# Patient Record
Sex: MALE | Race: WHITE | Employment: OTHER | URBAN - METROPOLITAN AREA
[De-identification: names, ages, dates, MRNs, and addresses within clinical notes are randomized per-mention and may not be internally consistent; named-entity substitution may affect disease eponyms.]

---

## 2017-05-24 ENCOUNTER — OFFICE VISIT (OUTPATIENT)
Dept: CARDIOLOGY CLINIC | Age: 82
End: 2017-05-24

## 2017-05-24 VITALS
OXYGEN SATURATION: 93 % | HEIGHT: 65 IN | HEART RATE: 72 BPM | BODY MASS INDEX: 22.33 KG/M2 | DIASTOLIC BLOOD PRESSURE: 78 MMHG | WEIGHT: 134 LBS | SYSTOLIC BLOOD PRESSURE: 142 MMHG | RESPIRATION RATE: 18 BRPM

## 2017-05-24 DIAGNOSIS — R06.02 SOB (SHORTNESS OF BREATH): Primary | ICD-10-CM

## 2017-05-24 DIAGNOSIS — I10 ESSENTIAL HYPERTENSION: ICD-10-CM

## 2017-05-24 DIAGNOSIS — R42 DIZZINESS: ICD-10-CM

## 2017-05-24 NOTE — PROGRESS NOTES
LAST OFFICE VISIT : 11/23/2016        ICD-10-CM ICD-9-CM   1. SOB (shortness of breath) R06.02 786.05   2. Dizziness R42 780.4   3. Essential hypertension I10 401.9            Stevo Lagunas is a 80 y.o. male with SOB referred for 6 month follow up evaluation. Cardiac risk factors: sedentary life style, male gender  I have personally obtained the history from the patient. HISTORY OF PRESENTING ILLNESS      Mr. Justin Savage is doing well from a cardiac standpoint. Recently diagnosed with Sjogren's disease and reports feeling weak from this. Activity level is also limited by hip and joint pain. He continues to ambulate with a cane. He has chronic GILMORE and fatigue with mild to moderate activity. He leads a sedentary lifestyle, and struggles to have motivation to work out. Denies any exertional symptoms. No claudication symptoms. The patient denies chest pain/ shortness of breath, orthopnea, PND, LE edema, palpitations, syncope, presyncope or fatigue. ACTIVE PROBLEM LIST     Patient Active Problem List    Diagnosis Date Noted    HTN (hypertension) 11/23/2016    Dizziness 08/09/2016           PAST MEDICAL HISTORY     Past Medical History:   Diagnosis Date    BPH (benign prostatic hyperplasia)     Essential hypertension     Fatigue     Hyperlipidemia     RA [facility]rheumatoid arthritis) ([facility] [address]            PAST SURGICAL HISTORY     History reviewed. No pertinent surgical history. ALLERGIES     Allergies   Allergen Reactions    Morphine Rash          FAMILY HISTORY     History reviewed. No pertinent family history.  negative for cardiac disease       SOCIAL HISTORY     Social History     Social History    Marital status:      Spouse name: N/A    Number of children: N/A    Years of education: N/A     Social History Main Topics    Smoking status: Never Smoker    Smokeless tobacco: None    Alcohol use No    Drug use: None    Sexual activity: Not Asked     Other Topics Concern    None Social History Narrative         MEDICATIONS     Current Outpatient Prescriptions   Medication Sig    hydroCHLOROthiazide (HYDRODIURIL) 25 mg tablet Take 25 mg by mouth daily.  ascorbic acid, vitamin C, (VITAMIN C) 500 mg tablet Take 1,000 mg by mouth.  amLODIPine (NORVASC) 10 mg tablet Take 0.5 mg by mouth daily.  atorvastatin (LIPITOR) 10 mg tablet Take  by mouth daily.  tamsulosin (FLOMAX) 0.4 mg capsule Take 0.4 mg by mouth nightly.  enalapril (VASOTEC) 20 mg tablet Take 20 mg by mouth daily.  GLUC JIM/CHONDRO JIM A/VIT C/MN (GLUCOSAMINE-CHONDROITIN MAX ST PO) Take 1,000 mg by mouth. 2 tab daily    fish oil-dha-epa 1,200-144-216 mg cap Take  by mouth.  aspirin delayed-release 81 mg tablet Take  by mouth daily.  multivitamin (ONE A DAY) tablet Take 1 Tab by mouth daily.  cholecalciferol, vitamin D3, 2,000 unit tab Take  by mouth.  nitroglycerin (NITROSTAT) 0.4 mg SL tablet by SubLINGual route every five (5) minutes as needed for Chest Pain. No current facility-administered medications for this visit. I have reviewed the nurses notes, vitals, problem list, allergy list, medical history, family, social history and medications. REVIEW OF SYMPTOMS      General: Pt denies excessive weight gain or loss. Pt is able to conduct ADL's. Positive for fatigue. HEENT: Denies blurred vision, headaches, hearing loss, epistaxis and difficulty swallowing. Respiratory: Denies cough, congestion, wheezing or stridor. Positive for SOB and GILMORE. Cardiovascular: Denies precordial pain, palpitations, edema or PND  Gastrointestinal: Denies poor appetite, indigestion, abdominal pain or blood in stool  Genitourinary: Denies hematuria, dysuria, increased urinary frequency  Musculoskeletal: Positive for multiple joint and hip pain.    Neurologic: Denies tremor, paresthesias, headache, or sensory motor disturbance  Psychiatric: Denies confusion, insomnia, depression  Integumentray: Denies rash, itching or ulcers. Hematologic: Denies easy bruising, bleeding     PHYSICAL EXAMINATION      Vitals:    05/24/17 1445   BP: 142/78   Pulse: 72   Resp: 18   SpO2: 93%   Weight: 134 lb (60.8 kg)   Height: 5' 5\" (1.651 m)     General: Well developed, in no acute distress. HEENT: No jaundice, oral mucosa moist, no oral ulcers  Neck: Supple, no stiffness, no lymphadenopathy, supple  Heart:  Normal S1/S2 negative S3 or S4. Regular, no murmur, gallop or rub, no jugular venous distention  Respiratory: Clear bilaterally x 4, no wheezing or rales  Extremities:  No edema, normal cap refill, no cyanosis. Musculoskeletal: No clubbing, no deformities  Neuro: A&Ox3, speech clear, gait stable, cooperative, no focal neurologic deficits  Skin: Skin color is normal. No rashes or lesions. Non diaphoretic, moist.  Vascular: 2+ pulses symmetric in all extremities         DIAGNOSTIC DATA   1. Echo  11/3/16 - EF 55-60%    2. Lipids  10/19/16- , HDL 32, LDL 79,     3. Lexiscan  11/3/16- No ischemia     LABORATORY DATA          No results found for: WBC, HGBPOC, HGB, HGBP, HCTPOC, HCT, PHCT, RBCH, PLT, MCV, HGBEXT, HCTEXT, PLTEXT   Lab Results   Component Value Date/Time    Bilirubin, total 0.5 10/19/2016 10:12 AM    AST (SGOT) 20 10/19/2016 10:12 AM    Alk. phosphatase 92 10/19/2016 10:12 AM    Protein, total 6.6 10/19/2016 10:12 AM    Albumin 4.2 10/19/2016 10:12 AM    ALT (SGPT) 12 10/19/2016 10:12 AM           ASSESSMENT/RECOMMENDATIONS:.      1. SOB  -In the past, it was felt that he was deconditioning. He's had normal echo and stress testing done in 2016. I do not see any need for cardiac risk testing. 2.  Preoperative risk stratification  -I believe he is at low cardiac risk for potential hip surgery based on all of my objective findings . 3. Dyslipidemia  -Lipids are at goal on Atorva 10mg.   -Will recheck cholesterol profile today. 5. Return in 6 months or PRN.      No orders of the defined types were placed in this encounter. Follow-up Disposition:  Return in about 6 months (around 11/24/2017). I have discussed the diagnosis with  Mayur August and the intended plan as seen in the above orders. Questions were answered concerning future plans. I have discussed medication side effects and warnings with the patient as well. Thank you,  Catherine Arana MD for involving me in the care of  Mayur August. Please do not hesitate to contact me for further questions/concerns. Written by Mile Mendosa, as dictated by Noel Coronado MD.    Ayaz Judd MD, [address] [place] [address] Rd[address]      [facility], [address] [place] [address] Drive      [phone] / [phone] Fax

## 2017-05-24 NOTE — PROGRESS NOTES
Visit Vitals    /78 (BP 1 Location: Left arm, BP Patient Position: Sitting)    Pulse 72    Resp 18    Ht 5' 5\" (1.651 m)    Wt 134 lb (60.8 kg)    SpO2 93%    BMI 22.3 kg/m2     Chief Complaint   Patient presents with    Cholesterol Problem    Hypertension     No refill no complaints

## 2017-05-24 NOTE — MR AVS SNAPSHOT
Visit Information Date & Time Provider Department Dept. Phone Encounter #  
 5/24/2017  3:00 PM Swetha Tamez MD [facility] [place] [facility] [phone] [id number] Follow-up Instructions Return in about 6 months (around 11/24/2017). Upcoming Health Maintenance Date Due DTaP/Tdap/Td series (1 - Tdap) 9/12/1956 ZOSTER VACCINE AGE 60> 9/12/1995 GLAUCOMA SCREENING Q2Y 9/12/2000 Pneumococcal 65+ Low/Medium Risk (1 of 2 - PCV13) 9/12/2000 MEDICARE YEARLY EXAM 9/12/2000 INFLUENZA AGE 9 TO ADULT 8/1/2017 Allergies as of 5/24/2017  Review Complete On: 5/24/2017 By: Bessie Christopher LPN Severity Noted Reaction Type Reactions Morphine  08/09/2016    Rash Current Immunizations  Never Reviewed No immunizations on file. Not reviewed this visit You Were Diagnosed With   
  
 Codes Comments SOB (shortness of breath)    -  Primary ICD-10-CM: R06.02 
ICD-9-CM: 786.05 Dizziness     ICD-10-CM: O31 ICD-9-CM: 780.4 Essential hypertension     ICD-10-CM: I10 
ICD-9-CM: 401.9 Vitals BP Pulse Resp Height(growth percentile) Weight(growth percentile) SpO2  
 142/78 (BP 1 Location: Left arm, BP Patient Position: Sitting) 72 18 5' 5\" (1.651 m) 134 lb (60.8 kg) 93% BMI Smoking Status 22.3 kg/m2 Never Smoker Vitals History BMI and BSA Data Body Mass Index Body Surface Area  
 22.3 kg/m 2 1.67 m 2 Preferred Pharmacy Pharmacy Name Phone [place] [id number] [facility] [address] [place] [address] [phone] Your Updated Medication List  
  
   
This list is accurate as of: 5/24/17  3:21 PM.  Always use your most recent med list. amLODIPine 10 mg tablet Commonly known as:  Aiyana Half Take 0.5 mg by mouth daily. aspirin delayed-release 81 mg tablet Take  by mouth daily. atorvastatin 10 mg tablet Commonly known as:  LIPITOR Take  by mouth daily. cholecalciferol (vitamin D3) 2,000 unit Tab Take  by mouth.  
  
 enalapril 20 mg tablet Commonly known as:  Tiny Spray Take 20 mg by mouth daily. fish oil-dha-epa 1,200-144-216 mg Cap Take  by mouth. GLUCOSAMINE-CHONDROITIN MAX ST PO Take 1,000 mg by mouth. 2 tab daily  
  
 hydroCHLOROthiazide 25 mg tablet Commonly known as:  HYDRODIURIL Take 25 mg by mouth daily. multivitamin tablet Commonly known as:  ONE A DAY Take 1 Tab by mouth daily. nitroglycerin 0.4 mg SL tablet Commonly known as:  NITROSTAT  
by SubLINGual route every five (5) minutes as needed for Chest Pain.  
  
 tamsulosin 0.4 mg capsule Commonly known as:  FLOMAX Take 0.4 mg by mouth nightly. VITAMIN C 500 mg tablet Generic drug:  ascorbic acid (vitamin C) Take 1,000 mg by mouth. We Performed the Following HEPATIC FUNCTION PANEL [07581 CPT(R)] LIPID PANEL [81002 CPT(R)] Follow-up Instructions Return in about 6 months (around 11/24/2017). Introducing [facility]! Keturah Bro introduces [employer] patient portal. Now you can access parts of your medical record, email your doctor's office, and request medication refills online. 1. In your internet browser, go to [email or web address] 2. Click on the First Time User? Click Here link in the Sign In box. You will see the New Member Sign Up page. 3. Enter your [employer] Access Code exactly as it appears below. You will not need to use this code after youve completed the sign-up process. If you do not sign up before the expiration date, you must request a new code. · [employer] Access Code: [email or web address] Expires: 8/22/2017  3:21 PM 
 
4. Enter the last four digits of your Social Security Number (xxxx) and Date of Birth (mm/dd/yyyy) as indicated and click Submit. You will be taken to the next sign-up page. 5. Create a [employer]TI ID. This will be your [employer]TI login ID and cannot be changed, so think of one that is secure and easy to remember. 6. Create a GTI password. You can change your password at any time. 7. Enter your Password Reset Question and Answer. This can be used at a later time if you forget your password. 8. Enter your e-mail address. You will receive e-mail notification when new information is available in [address] 9. Click Sign Up. You can now view and download portions of your medical record. 10. Click the Download Summary menu link to download a portable copy of your medical information. If you have questions, please visit the Frequently Asked Questions section of the [employer] website. Remember, [employer] is NOT to be used for urgent needs. For medical emergencies, dial 911. Now available from your iPhone and Android! Please provide this summary of care documentation to your next provider. Your primary care clinician is listed as Philipp Sanchez. If you have any questions after today's visit, please call [phone].

## 2017-07-19 ENCOUNTER — HOSPITAL ENCOUNTER (OUTPATIENT)
Dept: GENERAL RADIOLOGY | Age: 82
Discharge: HOME | End: 2017-07-19
Attending: ORTHOPAEDIC SURGERY
Payer: MEDICARE

## 2017-07-19 ENCOUNTER — HOSPITAL ENCOUNTER (OUTPATIENT)
Dept: PREADMISSION TESTING | Age: 82
Discharge: HOME | End: 2017-07-19
Payer: MEDICARE

## 2017-07-19 VITALS
RESPIRATION RATE: 19 BRPM | WEIGHT: 116.84 LBS | BODY MASS INDEX: 19.47 KG/M2 | TEMPERATURE: 97.7 F | SYSTOLIC BLOOD PRESSURE: 143 MMHG | DIASTOLIC BLOOD PRESSURE: 69 MMHG | HEIGHT: 65 IN | HEART RATE: 93 BPM | OXYGEN SATURATION: 97 %

## 2017-07-19 LAB
ABO + RH BLD: NORMAL
ALBUMIN SERPL BCP-MCNC: 4.1 G/DL (ref 3.5–5)
ALBUMIN/GLOB SERPL: 1 {RATIO} (ref 1.1–2.2)
ALP SERPL-CCNC: 117 U/L (ref 45–117)
ALT SERPL-CCNC: 21 U/L (ref 12–78)
ANION GAP BLD CALC-SCNC: 10 MMOL/L (ref 5–15)
APPEARANCE UR: CLEAR
APTT PPP: 29.9 SEC (ref 22.1–32.5)
AST SERPL W P-5'-P-CCNC: 20 U/L (ref 15–37)
ATRIAL RATE: 93 BPM
BACTERIA URNS QL MICRO: NEGATIVE /HPF
BASOPHILS # BLD AUTO: 0 K/UL (ref 0–0.1)
BASOPHILS # BLD: 0 % (ref 0–1)
BILIRUB SERPL-MCNC: 0.4 MG/DL (ref 0.2–1)
BILIRUB UR QL: NEGATIVE
BLOOD GROUP ANTIBODIES SERPL: NORMAL
BUN SERPL-MCNC: 16 MG/DL (ref 6–20)
BUN/CREAT SERPL: 12 (ref 12–20)
CALCIUM SERPL-MCNC: 9.7 MG/DL (ref 8.5–10.1)
CALCULATED P AXIS, ECG09: -5 DEGREES
CALCULATED R AXIS, ECG10: 29 DEGREES
CALCULATED T AXIS, ECG11: -3 DEGREES
CHLORIDE SERPL-SCNC: 96 MMOL/L (ref 97–108)
CO2 SERPL-SCNC: 30 MMOL/L (ref 21–32)
COLOR UR: ABNORMAL
CREAT SERPL-MCNC: 1.29 MG/DL (ref 0.7–1.3)
CRP SERPL-MCNC: <0.29 MG/DL
DIAGNOSIS, 93000: NORMAL
EOSINOPHIL # BLD: 0.2 K/UL (ref 0–0.4)
EOSINOPHIL NFR BLD: 2 % (ref 0–7)
EPITH CASTS URNS QL MICRO: ABNORMAL /LPF
ERYTHROCYTE [DISTWIDTH] IN BLOOD BY AUTOMATED COUNT: 12.5 % (ref 11.5–14.5)
EST. AVERAGE GLUCOSE BLD GHB EST-MCNC: 105 MG/DL
GLOBULIN SER CALC-MCNC: 4 G/DL (ref 2–4)
GLUCOSE SERPL-MCNC: 120 MG/DL (ref 65–100)
GLUCOSE UR STRIP.AUTO-MCNC: NEGATIVE MG/DL
HBA1C MFR BLD: 5.3 % (ref 4.2–6.3)
HCT VFR BLD AUTO: 41.1 % (ref 36.6–50.3)
HGB BLD-MCNC: 14.7 G/DL (ref 12.1–17)
HGB UR QL STRIP: NEGATIVE
HYALINE CASTS URNS QL MICRO: ABNORMAL /LPF (ref 0–5)
INR PPP: 1 (ref 0.9–1.1)
KETONES UR QL STRIP.AUTO: ABNORMAL MG/DL
LEUKOCYTE ESTERASE UR QL STRIP.AUTO: NEGATIVE
LYMPHOCYTES # BLD AUTO: 31 % (ref 12–49)
LYMPHOCYTES # BLD: 2.9 K/UL (ref 0.8–3.5)
MCH RBC QN AUTO: 34.3 PG (ref 26–34)
MCHC RBC AUTO-ENTMCNC: 35.8 G/DL (ref 30–36.5)
MCV RBC AUTO: 95.8 FL (ref 80–99)
MONOCYTES # BLD: 0.9 K/UL (ref 0–1)
MONOCYTES NFR BLD AUTO: 10 % (ref 5–13)
NEUTS SEG # BLD: 5.4 K/UL (ref 1.8–8)
NEUTS SEG NFR BLD AUTO: 57 % (ref 32–75)
NITRITE UR QL STRIP.AUTO: NEGATIVE
P-R INTERVAL, ECG05: 164 MS
PH UR STRIP: 6.5 [PH] (ref 5–8)
PLATELET # BLD AUTO: 273 K/UL (ref 150–400)
POTASSIUM SERPL-SCNC: 3.3 MMOL/L (ref 3.5–5.1)
PROT SERPL-MCNC: 8.1 G/DL (ref 6.4–8.2)
PROT UR STRIP-MCNC: NEGATIVE MG/DL
PROTHROMBIN TIME: 10 SEC (ref 9–11.1)
Q-T INTERVAL, ECG07: 364 MS
QRS DURATION, ECG06: 92 MS
QTC CALCULATION (BEZET), ECG08: 452 MS
RBC # BLD AUTO: 4.29 M/UL (ref 4.1–5.7)
RBC #/AREA URNS HPF: ABNORMAL /HPF (ref 0–5)
SODIUM SERPL-SCNC: 136 MMOL/L (ref 136–145)
SP GR UR REFRACTOMETRY: 1.02 (ref 1–1.03)
SPECIMEN EXP DATE BLD: NORMAL
THERAPEUTIC RANGE,PTTT: NORMAL SECS (ref 58–77)
UA: UC IF INDICATED,UAUC: ABNORMAL
UROBILINOGEN UR QL STRIP.AUTO: 0.2 EU/DL (ref 0.2–1)
VENTRICULAR RATE, ECG03: 93 BPM
WBC # BLD AUTO: 9.5 K/UL (ref 4.1–11.1)
WBC URNS QL MICRO: ABNORMAL /HPF (ref 0–4)

## 2017-07-19 PROCEDURE — 80053 COMPREHEN METABOLIC PANEL: CPT | Performed by: ORTHOPAEDIC SURGERY

## 2017-07-19 PROCEDURE — 85730 THROMBOPLASTIN TIME PARTIAL: CPT | Performed by: ORTHOPAEDIC SURGERY

## 2017-07-19 PROCEDURE — 36415 COLL VENOUS BLD VENIPUNCTURE: CPT | Performed by: ORTHOPAEDIC SURGERY

## 2017-07-19 PROCEDURE — 86140 C-REACTIVE PROTEIN: CPT | Performed by: ORTHOPAEDIC SURGERY

## 2017-07-19 PROCEDURE — 71020 XR CHEST PA LAT: CPT

## 2017-07-19 PROCEDURE — 84466 ASSAY OF TRANSFERRIN: CPT | Performed by: ORTHOPAEDIC SURGERY

## 2017-07-19 PROCEDURE — 93005 ELECTROCARDIOGRAM TRACING: CPT

## 2017-07-19 PROCEDURE — 85025 COMPLETE CBC W/AUTO DIFF WBC: CPT | Performed by: ORTHOPAEDIC SURGERY

## 2017-07-19 PROCEDURE — 81001 URINALYSIS AUTO W/SCOPE: CPT | Performed by: ORTHOPAEDIC SURGERY

## 2017-07-19 PROCEDURE — 85610 PROTHROMBIN TIME: CPT | Performed by: ORTHOPAEDIC SURGERY

## 2017-07-19 PROCEDURE — 86900 BLOOD TYPING SEROLOGIC ABO: CPT | Performed by: ORTHOPAEDIC SURGERY

## 2017-07-19 PROCEDURE — 83036 HEMOGLOBIN GLYCOSYLATED A1C: CPT | Performed by: ORTHOPAEDIC SURGERY

## 2017-07-19 RX ORDER — AMLODIPINE BESYLATE 5 MG/1
5 TABLET ORAL
COMMUNITY
End: 2019-02-06

## 2017-07-19 RX ORDER — ASPIRIN 81 MG/1
81 TABLET ORAL DAILY
COMMUNITY
End: 2017-08-04

## 2017-07-19 RX ORDER — VITAMIN E 268 MG
400 CAPSULE ORAL DAILY
COMMUNITY
End: 2019-02-06

## 2017-07-19 RX ORDER — VITAMIN E 1000 UNIT
1000 CAPSULE ORAL DAILY
COMMUNITY
End: 2020-02-19

## 2017-07-19 RX ORDER — ENALAPRIL MALEATE 20 MG/1
20 TABLET ORAL DAILY
COMMUNITY
End: 2018-01-31

## 2017-07-19 RX ORDER — HYDROCHLOROTHIAZIDE 25 MG/1
12.5 TABLET ORAL DAILY
COMMUNITY
End: 2020-02-28

## 2017-07-19 RX ORDER — TAMSULOSIN HYDROCHLORIDE 0.4 MG/1
0.4 CAPSULE ORAL
COMMUNITY

## 2017-07-19 RX ORDER — HYDROCODONE BITARTRATE AND ACETAMINOPHEN 5; 325 MG/1; MG/1
1 TABLET ORAL
COMMUNITY
End: 2017-08-04

## 2017-07-19 NOTE — H&P
PAT Pre-Op History & Physical    Patient: Yvonne Sotelo                  MRN: [id number]          SSN: [id number]  YOB: 1935          Age: 80 y.o. Sex: male                Subjective:   Patient is a 80 y.o.  male who presents with history of chronic left hip pain that began about 2 years ago per patient report. 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 Rates his left hip pain 2-10/10. Describes his left hip pain as constant aching pain that radiates from his left groin down his left leg to his foot. States that the pain keeps him up at night as he cannot get comfortable in bed. Has failed steroid joint injections, oral steroids, NSAIDs, and narcotic pain medications. The patient was evaluated in the surgeon's office and it was determined that the most appropriate plan of care is to proceed with surgical intervention. Patient's PCP Abdiel Beal MD           Past Medical History:   Diagnosis Date    Arthritis     rheumatoid    Autoimmune disease ([facility] [address]     sjogren's syndrome, rheumatoid arhtritis    BPH (benign prostatic hyperplasia)     Cancer ([facility] [address]     skin cancer on ear right    Elevated cholesterol     Hypertension     Ill-defined condition     avascular necrosis left hip    Kidney stone       Past Surgical History:   Procedure Laterality Date    HX HEENT      bliat cataract surgery    HX ORTHOPAEDIC      right rotator cuff repair      Prior to Admission medications    Medication Sig Start Date End Date Taking? Authorizing Provider   ATORVASTATIN CALCIUM (ATORVASTATIN PO) Take 10 mg by mouth nightly. Yes Historical Provider   amLODIPine (NORVASC) 5 mg tablet Take 5 mg by mouth. Yes Historical Provider   tamsulosin (FLOMAX) 0.4 mg capsule Take 0.4 mg by mouth nightly. Yes Historical Provider   enalapril (VASOTEC) 20 mg tablet Take 20 mg by mouth daily.    Yes Historical Provider hydroCHLOROthiazide (HYDRODIURIL) 25 mg tablet Take 25 mg by mouth daily. Yes Historical Provider   ascorbic acid, vitamin C, (VITAMIN C) 1,000 mg tablet Take 1,000 mg by mouth daily. Yes Historical Provider   OMEGA-3 FATTY ACIDS (FISH OIL CONCENTRATE PO) Take 600 mg by mouth daily. Yes Historical Provider   multivitamin, tx-iron-ca-min (THERA-M W/ IRON) 9 mg iron-400 mcg tab tablet Take 1 Tab by mouth daily. Yes Historical Provider   vitamin E (AQUA GEMS) 400 unit capsule Take 400 Units by mouth daily. Yes Historical Provider   aspirin delayed-release 81 mg tablet Take 81 mg by mouth daily. Yes Historical Provider   CHOLECALCIFEROL, VITAMIN D3, (VITAMIN D3 PO) Take 1,000 Units by mouth daily. Yes Historical Provider   HYDROcodone-acetaminophen (NORCO) 5-325 mg per tablet Take 1 Tab by mouth every eight (8) hours as needed for Pain. Yes Historical Provider     Current Outpatient Prescriptions   Medication Sig    ATORVASTATIN CALCIUM (ATORVASTATIN PO) Take 10 mg by mouth nightly.  amLODIPine (NORVASC) 5 mg tablet Take 5 mg by mouth.  tamsulosin (FLOMAX) 0.4 mg capsule Take 0.4 mg by mouth nightly.  enalapril (VASOTEC) 20 mg tablet Take 20 mg by mouth daily.  hydroCHLOROthiazide (HYDRODIURIL) 25 mg tablet Take 25 mg by mouth daily.  ascorbic acid, vitamin C, (VITAMIN C) 1,000 mg tablet Take 1,000 mg by mouth daily.  OMEGA-3 FATTY ACIDS (FISH OIL CONCENTRATE PO) Take 600 mg by mouth daily.  multivitamin, tx-iron-ca-min (THERA-M W/ IRON) 9 mg iron-400 mcg tab tablet Take 1 Tab by mouth daily.  vitamin E (AQUA GEMS) 400 unit capsule Take 400 Units by mouth daily.  aspirin delayed-release 81 mg tablet Take 81 mg by mouth daily.  CHOLECALCIFEROL, VITAMIN D3, (VITAMIN D3 PO) Take 1,000 Units by mouth daily.  HYDROcodone-acetaminophen (NORCO) 5-325 mg per tablet Take 1 Tab by mouth every eight (8) hours as needed for Pain.      No current facility-administered medications for this encounter. Allergies   Allergen Reactions    Other Food Not Reported This Time     Does not eat deer meat or other sausages    Beef Containing Products Anaphylaxis     Reaction after tick bite    Pork Derived (Porcine) Anaphylaxis     Reaction after tick bite    Morphine Other (comments)     unknown      Social History   Substance Use Topics    Smoking status: Former Smoker     Quit date: 1970    Smokeless tobacco: Former User     Quit date: 1980      Comment: smoked cigars      Alcohol use No      History   Drug Use No     Family History   Problem Relation Age of Onset    Diabetes Mother     Arthritis-osteo Father     Diabetes Sister     No Known Problems Brother          Review of Systems    Patient denies difficulty swallowing, mouth sores, or loose teeth. Patient denies any recent dental procedures or any planned prior to surgery. States he is hard of hearing. Patient denies chest pain, tightness, pain radiating down left arm, palpitations. Denies dizziness, visual disturbances, or lightheadedness. Patient denies shortness of breath, wheezing, cough, fever, or chills. Patient denies diarrhea, constipation, or abdominal pain. Patient denies urinary problems including dysuria, hesitancy, or incontinence. C/o urinary urgency and frequency. Denies skin breakdown, insect bites or open area. C/o pruritic rash on his back. C?o left hip pain. Objective:   Patient Vitals for the past 24 hrs:   Temp Pulse Resp BP SpO2   17 1316 97.7 °F (36.5 °C) 93 19 143/69 97 %     Temp (24hrs), Av.7 °F (36.5 °C), Min:97.7 °F (36.5 °C), Max:97.7 °F (36.5 °C)    Body mass index is 19.44 kg/(m^2). Wt Readings from Last 1 Encounters:   17 53 kg (116 lb 13.5 oz)        Physical Exam:     General: Pleasant,  cooperative, no apparent distress, appears stated age. Uses cane to ambulate. Eyes: Conjunctivae/corneas clear. EOMs intact.    Nose: Nares normal.   Mouth/Throat: Lips, mucosa, and tongue normal. Full upper and lower dentures in place. Neck: Supple, symmetrical, trachea midline. Back: Symmetric   Lungs: Clear to auscultation bilaterally. Heart: Regular rate and rhythm, S1, S2 normal. No murmur, click, rub or gallop. Abdomen: Soft, non-tender. Bowel sounds normal. No distention. Musculoskeletal:  Gait is antalgic. Extremities:  Extremities normal, atraumatic, no cyanosis. Trace ankle/pedal edema L>R. Calves                                 supple, non tender to palpation. Pulses: 2+ and symmetric bilateral upper extremities. Cap. refill <2 seconds   Skin: Skin color, texture, turgor normal.  No lesions. Scattered pink rash noted on back. Neurologic: CN II-XII grossly intact. Alert and oriented x3. Labs:   Recent Results (from the past 67 hour(s))   CULTURE, MRSA    Collection Time: 07/19/17  2:07 PM   Result Value Ref Range    Special Requests: NO SPECIAL REQUESTS      Culture result: MRSA NOT PRESENT      Culture result:            Screening of patient nares for MRSA is for surveillance purposes and, if positive, to facilitate isolation considerations in high risk settings. It is not intended for automatic decolonization interventions per se as regimens are not sufficiently effective to warrant routine use. CBC WITH AUTOMATED DIFF    Collection Time: 07/19/17  2:49 PM   Result Value Ref Range    WBC 9.5 4.1 - 11.1 K/uL    RBC 4.29 4. 10 - 5.70 M/uL    HGB 14.7 12.1 - 17.0 g/dL    HCT 41.1 36.6 - 50.3 %    MCV 95.8 80.0 - 99.0 FL    MCH 34.3 (H) 26.0 - 34.0 PG    MCHC 35.8 30.0 - 36.5 g/dL    RDW 12.5 11.5 - 14.5 %    PLATELET 391 940 - 908 K/uL    NEUTROPHILS 57 32 - 75 %    LYMPHOCYTES 31 12 - 49 %    MONOCYTES 10 5 - 13 %    EOSINOPHILS 2 0 - 7 %    BASOPHILS 0 0 - 1 %    ABS. NEUTROPHILS 5.4 1.8 - 8.0 K/UL    ABS. LYMPHOCYTES 2.9 0.8 - 3.5 K/UL    ABS. MONOCYTES 0.9 0.0 - 1.0 K/UL    ABS. EOSINOPHILS 0.2 0.0 - 0.4 K/UL    ABS.  BASOPHILS 0.0 0.0 - 0.1 K/UL METABOLIC PANEL, COMPREHENSIVE    Collection Time: 07/19/17  2:49 PM   Result Value Ref Range    Sodium 136 136 - 145 mmol/L    Potassium 3.3 (L) 3.5 - 5.1 mmol/L    Chloride 96 (L) 97 - 108 mmol/L    CO2 30 21 - 32 mmol/L    Anion gap 10 5 - 15 mmol/L    Glucose 120 (H) 65 - 100 mg/dL    BUN 16 6 - 20 MG/DL    Creatinine 1.29 0.70 - 1.30 MG/DL    BUN/Creatinine ratio 12 12 - 20      GFR est AA >60 >60 ml/min/1.73m2    GFR est non-AA 53 (L) >60 ml/min/1.73m2    Calcium 9.7 8.5 - 10.1 MG/DL    Bilirubin, total 0.4 0.2 - 1.0 MG/DL    ALT (SGPT) 21 12 - 78 U/L    AST (SGOT) 20 15 - 37 U/L    Alk.  phosphatase 117 45 - 117 U/L    Protein, total 8.1 6.4 - 8.2 g/dL    Albumin 4.1 3.5 - 5.0 g/dL    Globulin 4.0 2.0 - 4.0 g/dL    A-G Ratio 1.0 (L) 1.1 - 2.2     C REACTIVE PROTEIN, QT    Collection Time: 07/19/17  2:49 PM   Result Value Ref Range    C-Reactive protein <0.29 <0.60 mg/dL   HEMOGLOBIN A1C WITH EAG    Collection Time: 07/19/17  2:49 PM   Result Value Ref Range    Hemoglobin A1c 5.3 4.2 - 6.3 %    Est. average glucose 105 mg/dL   TYPE & SCREEN    Collection Time: 07/19/17  2:49 PM   Result Value Ref Range    Crossmatch Expiration 08/02/2017     ABO/Rh(D) Shad Bran POSITIVE     Antibody screen NEG    TRANSFERRIN    Collection Time: 07/19/17  2:49 PM   Result Value Ref Range    Transferrin 229 200 - 370 mg/dL   PROTHROMBIN TIME + INR    Collection Time: 07/19/17  3:03 PM   Result Value Ref Range    INR 1.0 0.9 - 1.1      Prothrombin time 10.0 9.0 - 11.1 sec   PTT    Collection Time: 07/19/17  3:03 PM   Result Value Ref Range    aPTT 29.9 22.1 - 32.5 sec    aPTT, therapeutic range     58.0 - 77.0 SECS   URINALYSIS W/ REFLEX CULTURE    Collection Time: 07/19/17  3:03 PM   Result Value Ref Range    Color YELLOW/STRAW      Appearance CLEAR CLEAR      Specific gravity 1.019 1.003 - 1.030      pH (UA) 6.5 5.0 - 8.0      Protein NEGATIVE  NEG mg/dL    Glucose NEGATIVE  NEG mg/dL    Ketone TRACE (A) NEG mg/dL    Bilirubin NEGATIVE  NEG      Blood NEGATIVE  NEG      Urobilinogen 0.2 0.2 - 1.0 EU/dL    Nitrites NEGATIVE  NEG      Leukocyte Esterase NEGATIVE  NEG      WBC 0-4 0 - 4 /hpf    RBC 0-5 0 - 5 /hpf    Epithelial cells FEW FEW /lpf    Bacteria NEGATIVE  NEG /hpf    UA:UC IF INDICATED CULTURE NOT INDICATED BY UA RESULT CNI      Hyaline cast 2-5 0 - 5 /lpf   EKG, 12 LEAD, INITIAL    Collection Time: 07/19/17  3:14 PM   Result Value Ref Range    Ventricular Rate 93 BPM    Atrial Rate 93 BPM    P-R Interval 164 ms    QRS Duration 92 ms    Q-T Interval 364 ms    QTC Calculation (Bezet) 452 ms    Calculated P Axis -5 degrees    Calculated R Axis 29 degrees    Calculated T Axis -3 degrees    Diagnosis       Normal sinus rhythm  Nonspecific ST abnormality  Abnormal ECG  No previous ECGs available  Confirmed by Dimitry Blackman MD., Jaylin Mosley ([id number]) on 7/19/2017 6:53:30 PM         Assessment:     Left hip arthritis    Plan:     Scheduled for left total hip arthroplasty- direct anterior. Labs, EKG, and CXR done per surgeon's order. Lab results, EKG, and CXR reviewed- unremarkable except Potassium= 3.3, Creatinine= 1.29/ GFR= 53. Documented renal insufficiency POA per [facility] joint protocol. Attended joint class 7/19/2017. Cardiology note/clearance from Dr. Anaid Strickland reviewed and placed on chart.     Katie Child NP

## 2017-07-19 NOTE — PERIOP NOTES
[facility]  PREOPERATIVE INSTRUCTIONS    Surgery Date:   8/2/2017  Surgery arrival time given by surgeon: NO   If [facility] staff will call you between 4 PM- 8 PM the day before surgery with your arrival time. If your surgery is on a Monday, we will call you the preceding Friday. Please call [phone] after 8 PM if you did not receive your arrival time. 1. Please report at the designated time to the [address] [place] [address] [address] [facility]. Bring your insurance card, photo identification, and any copayment ( if applicable). 2. You must have a responsible adult to drive you home. You need to have a responsible adult to stay with you the first 24 hours after surgery if you are going home the same day of your surgery and you should not drive a car for 24 hours following your surgery. 3. Nothing to eat or drink after midnight the night before surgery. This includes no water, gum, mints, coffee, juice, etc.  Please note special instructions, if applicable, below for medications. 4. MEDICATIONS TO TAKE THE MORNING OF SURGERY WITH A SIP OF WATER: ___amlodipine, ___________        Your prescription pain medicine may be taken with a sip of water the morning of surgery  5. No alcoholic beverages 24 hours before or after your surgery. 6. If you are being admitted to the hospital, please leave personal belongings/luggage in your car until you have an assigned hospital room number. 7. Stop Aspirin and/or any non-steroidal anti-inflammatory drugs (i.e. Ibuprofen, Naproxen, Advil, Aleve) as directed by your surgeon. You may take Tylenol. 8. Stop herbal supplements 1 week prior to surgery. 9. If you are currently taking Plavix, Coumadin,or any other blood-thinning/anticoagulant medication contact your surgeon for instructions. 10. Please wear comfortable clothes. Wear your glasses instead of contacts. We ask that all money, jewelry and valuables be left at home. Wear no make-up, particularly mascara, the day of surgery.    11.  All body piercings, rings and jewelry need to be removed and left at home. Please wear your hair loose or down. Please no pony-tails, buns, or any metal hair accessories. If you shower the morning of surgery, please do not apply any lotions, powders, or deodorants afterwards. Do not shave any body area within 24 hours of your surgery. 12. Please follow all instructions to avoid any potential surgical cancellation. 13. Should your physical condition change, (i.e. fever, cold, flu, etc.) please notify your surgeon as soon as possible. 14. It is important to be on time. If a situation occurs where you may be delayed, please call:  [phone] / [phone] on the day of surgery. 15. The Preadmission Testing staff can be reached at [phone]. Alison Mitchell 12. Bring your completed Medication Reconciliation sheet with your the morning of surgery  Special instructions:   · Free  Parking between [address]  The patient and spouse was contacted  in person. They verbalize  understanding of all instructions   Medications reviewed and med reconciliation sheets for prescriptions given to patient for review & return day of surgery. Special Instructions:  · Use Chlorhexidine Care Fusion wash and sponges 3 days prior to surgery as instructed. · Incentive spirometer given with instructions to practice at home and bring back to the hospital on the day of surgery. · [facility] will contact you if your Hemoglobin A1C is greater than 7.5. · Ensure/Glucerna  sample, nutritional information, and Ensure/Glucerna coupon given. · Pain pamphlet and Call Don't Fall reminder reviewed with patient.   ·  parking is complimentary Monday - Friday 7 am - 5 pm  · Bring PTA Medication list day of surgery with the last doses taken documented

## 2017-07-20 LAB
BACTERIA SPEC CULT: NORMAL
BACTERIA SPEC CULT: NORMAL
SERVICE CMNT-IMP: NORMAL

## 2017-07-20 NOTE — PROGRESS NOTES
Problem: Patient Education: Go to Patient Education Activity  Goal: Patient/Family Education  Outcome: Progressing Towards Goal  The patient attended the pre-operative joint replacement class. The content of the class, using a power-point presentation as well as visual demonstration was specific for patients undergoing total knee and total hip replacements. A pre-operative education booklet was provided to all patients. At the conclusion of class an opportunity was offered for any question or concerns the patient or family may have regarding their upcoming scheduled procedure. Patient and coaches attended class on 7/19/17.

## 2017-07-21 LAB — TRANSFERRIN SERPL-MCNC: 229 MG/DL (ref 200–370)

## 2017-08-01 ENCOUNTER — ANESTHESIA EVENT (OUTPATIENT)
Dept: SURGERY | Age: 82
DRG: 470 | End: 2017-08-01
Payer: MEDICARE

## 2017-08-02 ENCOUNTER — APPOINTMENT (OUTPATIENT)
Dept: GENERAL RADIOLOGY | Age: 82
DRG: 470 | End: 2017-08-02
Attending: ORTHOPAEDIC SURGERY
Payer: MEDICARE

## 2017-08-02 ENCOUNTER — ANESTHESIA (OUTPATIENT)
Dept: SURGERY | Age: 82
DRG: 470 | End: 2017-08-02
Payer: MEDICARE

## 2017-08-02 ENCOUNTER — HOSPITAL ENCOUNTER (INPATIENT)
Age: 82
LOS: 2 days | Discharge: HOME HEALTH | DRG: 470 | End: 2017-08-04
Attending: ORTHOPAEDIC SURGERY | Admitting: ORTHOPAEDIC SURGERY
Payer: MEDICARE

## 2017-08-02 PROBLEM — M16.10 HIP ARTHRITIS: Status: ACTIVE | Noted: 2017-08-02

## 2017-08-02 LAB
APPEARANCE FLD: ABNORMAL
ATRIAL RATE: 97 BPM
CALCULATED P AXIS, ECG09: 47 DEGREES
CALCULATED R AXIS, ECG10: 6 DEGREES
CALCULATED T AXIS, ECG11: 38 DEGREES
COLOR FLD: ABNORMAL
DIAGNOSIS, 93000: NORMAL
LYMPHOCYTES NFR FLD: 15 %
MESOTHL CELL NFR FLD: 1 %
MONOS+MACROS NFR FLD: 13 %
NEUTS SEG NFR FLD: 71 %
NUC CELL # FLD: 395 /CU MM (ref 0–5)
P-R INTERVAL, ECG05: 168 MS
Q-T INTERVAL, ECG07: 372 MS
QRS DURATION, ECG06: 88 MS
QTC CALCULATION (BEZET), ECG08: 472 MS
RBC # FLD: >100 /CU MM
SPECIMEN SOURCE FLD: ABNORMAL
VENTRICULAR RATE, ECG03: 97 BPM

## 2017-08-02 PROCEDURE — 89050 BODY FLUID CELL COUNT: CPT | Performed by: ORTHOPAEDIC SURGERY

## 2017-08-02 PROCEDURE — 74011000250 HC RX REV CODE- 250

## 2017-08-02 PROCEDURE — 65270000029 HC RM PRIVATE

## 2017-08-02 PROCEDURE — 87075 CULTR BACTERIA EXCEPT BLOOD: CPT | Performed by: ORTHOPAEDIC SURGERY

## 2017-08-02 PROCEDURE — 74011000258 HC RX REV CODE- 258: Performed by: ORTHOPAEDIC SURGERY

## 2017-08-02 PROCEDURE — 74011250637 HC RX REV CODE- 250/637: Performed by: ANESTHESIOLOGY

## 2017-08-02 PROCEDURE — 77030010507 HC ADH SKN DERMBND J&J -B: Performed by: ORTHOPAEDIC SURGERY

## 2017-08-02 PROCEDURE — 76060000063 HC AMB SURG ANES 1.5 TO 2 HR: Performed by: ORTHOPAEDIC SURGERY

## 2017-08-02 PROCEDURE — 76000 FLUOROSCOPY <1 HR PHYS/QHP: CPT

## 2017-08-02 PROCEDURE — 77030013708 HC HNDPC SUC IRR PULS STRY –B: Performed by: ORTHOPAEDIC SURGERY

## 2017-08-02 PROCEDURE — 77030002933 HC SUT MCRYL J&J -A: Performed by: ORTHOPAEDIC SURGERY

## 2017-08-02 PROCEDURE — 74011250636 HC RX REV CODE- 250/636: Performed by: ANESTHESIOLOGY

## 2017-08-02 PROCEDURE — 0SRB0JA REPLACEMENT OF LEFT HIP JOINT WITH SYNTHETIC SUBSTITUTE, UNCEMENTED, OPEN APPROACH: ICD-10-PCS | Performed by: ORTHOPAEDIC SURGERY

## 2017-08-02 PROCEDURE — 93970 EXTREMITY STUDY: CPT

## 2017-08-02 PROCEDURE — 74011250636 HC RX REV CODE- 250/636: Performed by: ORTHOPAEDIC SURGERY

## 2017-08-02 PROCEDURE — 77030007866 HC KT SPN ANES BBMI -B: Performed by: NURSE ANESTHETIST, CERTIFIED REGISTERED

## 2017-08-02 PROCEDURE — 72170 X-RAY EXAM OF PELVIS: CPT

## 2017-08-02 PROCEDURE — 74011000272 HC RX REV CODE- 272: Performed by: ORTHOPAEDIC SURGERY

## 2017-08-02 PROCEDURE — 74011000250 HC RX REV CODE- 250: Performed by: ORTHOPAEDIC SURGERY

## 2017-08-02 PROCEDURE — 77030031139 HC SUT VCRL2 J&J -A: Performed by: ORTHOPAEDIC SURGERY

## 2017-08-02 PROCEDURE — 93005 ELECTROCARDIOGRAM TRACING: CPT

## 2017-08-02 PROCEDURE — 77030018836 HC SOL IRR NACL ICUM -A: Performed by: ORTHOPAEDIC SURGERY

## 2017-08-02 PROCEDURE — C1776 JOINT DEVICE (IMPLANTABLE): HCPCS | Performed by: ORTHOPAEDIC SURGERY

## 2017-08-02 PROCEDURE — 77030011640 HC PAD GRND REM COVD -A: Performed by: ORTHOPAEDIC SURGERY

## 2017-08-02 PROCEDURE — 77030018883 HC BLD SAW SAG4 STRY -B: Performed by: ORTHOPAEDIC SURGERY

## 2017-08-02 PROCEDURE — 74011250636 HC RX REV CODE- 250/636

## 2017-08-02 PROCEDURE — 77030020788: Performed by: ORTHOPAEDIC SURGERY

## 2017-08-02 PROCEDURE — 77030034850

## 2017-08-02 PROCEDURE — 74011000258 HC RX REV CODE- 258

## 2017-08-02 PROCEDURE — 76210000034 HC AMBSU PH I REC 0.5 TO 1 HR: Performed by: ORTHOPAEDIC SURGERY

## 2017-08-02 PROCEDURE — 74011250637 HC RX REV CODE- 250/637: Performed by: ORTHOPAEDIC SURGERY

## 2017-08-02 PROCEDURE — 87205 SMEAR GRAM STAIN: CPT | Performed by: ORTHOPAEDIC SURGERY

## 2017-08-02 PROCEDURE — 77030020782 HC GWN BAIR PAWS FLX 3M -B

## 2017-08-02 PROCEDURE — 76030000020 HC AMB SURG 1.5 TO 2 HR INTENSV-TIER 1: Performed by: ORTHOPAEDIC SURGERY

## 2017-08-02 PROCEDURE — 77030032490 HC SLV COMPR SCD KNE COVD -B

## 2017-08-02 DEVICE — INSERT ACET 0DEG 36MM E X3 -- TRIDENT: Type: IMPLANTABLE DEVICE | Site: HIP | Status: FUNCTIONAL

## 2017-08-02 DEVICE — HEAD FEM DELT V40 -2.5MM 36MM -- V40 BIOLOX: Type: IMPLANTABLE DEVICE | Site: HIP | Status: FUNCTIONAL

## 2017-08-02 DEVICE — COMPONENT HIP PRSS FT MTL ON CERM POLYETH X3: Type: IMPLANTABLE DEVICE | Status: FUNCTIONAL

## 2017-08-02 RX ORDER — SODIUM CHLORIDE, SODIUM LACTATE, POTASSIUM CHLORIDE, CALCIUM CHLORIDE 600; 310; 30; 20 MG/100ML; MG/100ML; MG/100ML; MG/100ML
100 INJECTION, SOLUTION INTRAVENOUS CONTINUOUS
Status: DISCONTINUED | OUTPATIENT
Start: 2017-08-02 | End: 2017-08-02 | Stop reason: HOSPADM

## 2017-08-02 RX ORDER — FENTANYL CITRATE 50 UG/ML
INJECTION, SOLUTION INTRAMUSCULAR; INTRAVENOUS AS NEEDED
Status: DISCONTINUED | OUTPATIENT
Start: 2017-08-02 | End: 2017-08-02 | Stop reason: HOSPADM

## 2017-08-02 RX ORDER — ATORVASTATIN CALCIUM 10 MG/1
10 TABLET, FILM COATED ORAL
Status: DISCONTINUED | OUTPATIENT
Start: 2017-08-02 | End: 2017-08-04 | Stop reason: HOSPADM

## 2017-08-02 RX ORDER — FACIAL-BODY WIPES
10 EACH TOPICAL DAILY
Qty: 2 SUPPOSITORY | Refills: 0 | Status: SHIPPED | OUTPATIENT
Start: 2017-08-02 | End: 2019-02-06

## 2017-08-02 RX ORDER — BUPIVACAINE HYDROCHLORIDE 7.5 MG/ML
INJECTION, SOLUTION EPIDURAL; RETROBULBAR AS NEEDED
Status: DISCONTINUED | OUTPATIENT
Start: 2017-08-02 | End: 2017-08-02 | Stop reason: HOSPADM

## 2017-08-02 RX ORDER — AMLODIPINE BESYLATE 5 MG/1
5 TABLET ORAL DAILY
Status: DISCONTINUED | OUTPATIENT
Start: 2017-08-03 | End: 2017-08-04 | Stop reason: HOSPADM

## 2017-08-02 RX ORDER — FAMOTIDINE 20 MG/1
20 TABLET, FILM COATED ORAL EVERY EVENING
Status: DISCONTINUED | OUTPATIENT
Start: 2017-08-02 | End: 2017-08-04 | Stop reason: HOSPADM

## 2017-08-02 RX ORDER — ASPIRIN 325 MG
325 TABLET ORAL 2 TIMES DAILY
Qty: 30 TAB | Refills: 0 | Status: SHIPPED | OUTPATIENT
Start: 2017-08-02 | End: 2019-02-06 | Stop reason: DRUGHIGH

## 2017-08-02 RX ORDER — PROPOFOL 10 MG/ML
INJECTION, EMULSION INTRAVENOUS
Status: DISCONTINUED | OUTPATIENT
Start: 2017-08-02 | End: 2017-08-02 | Stop reason: HOSPADM

## 2017-08-02 RX ORDER — CEFAZOLIN SODIUM IN 0.9 % NACL 2 G/50 ML
2 INTRAVENOUS SOLUTION, PIGGYBACK (ML) INTRAVENOUS ONCE
Status: COMPLETED | OUTPATIENT
Start: 2017-08-02 | End: 2017-08-02

## 2017-08-02 RX ORDER — FAMOTIDINE 20 MG/1
20 TABLET, FILM COATED ORAL 2 TIMES DAILY
Status: DISCONTINUED | OUTPATIENT
Start: 2017-08-02 | End: 2017-08-02

## 2017-08-02 RX ORDER — AMOXICILLIN 250 MG
1 CAPSULE ORAL 2 TIMES DAILY
Status: DISCONTINUED | OUTPATIENT
Start: 2017-08-02 | End: 2017-08-04 | Stop reason: HOSPADM

## 2017-08-02 RX ORDER — ENALAPRIL MALEATE 5 MG/1
20 TABLET ORAL DAILY
Status: DISCONTINUED | OUTPATIENT
Start: 2017-08-03 | End: 2017-08-04 | Stop reason: HOSPADM

## 2017-08-02 RX ORDER — OXYCODONE HYDROCHLORIDE 5 MG/1
10 TABLET ORAL
Status: DISCONTINUED | OUTPATIENT
Start: 2017-08-02 | End: 2017-08-04 | Stop reason: HOSPADM

## 2017-08-02 RX ORDER — SODIUM CHLORIDE 0.9 % (FLUSH) 0.9 %
5-10 SYRINGE (ML) INJECTION AS NEEDED
Status: DISCONTINUED | OUTPATIENT
Start: 2017-08-02 | End: 2017-08-02 | Stop reason: HOSPADM

## 2017-08-02 RX ORDER — SODIUM CHLORIDE 0.9 % (FLUSH) 0.9 %
5-10 SYRINGE (ML) INJECTION EVERY 8 HOURS
Status: DISCONTINUED | OUTPATIENT
Start: 2017-08-03 | End: 2017-08-04 | Stop reason: HOSPADM

## 2017-08-02 RX ORDER — LIDOCAINE HYDROCHLORIDE 20 MG/ML
JELLY TOPICAL
Status: COMPLETED
Start: 2017-08-02 | End: 2017-08-02

## 2017-08-02 RX ORDER — SODIUM CHLORIDE, SODIUM LACTATE, POTASSIUM CHLORIDE, CALCIUM CHLORIDE 600; 310; 30; 20 MG/100ML; MG/100ML; MG/100ML; MG/100ML
125 INJECTION, SOLUTION INTRAVENOUS CONTINUOUS
Status: DISCONTINUED | OUTPATIENT
Start: 2017-08-02 | End: 2017-08-02 | Stop reason: HOSPADM

## 2017-08-02 RX ORDER — OXYCODONE HYDROCHLORIDE 5 MG/1
5 TABLET ORAL
Status: DISCONTINUED | OUTPATIENT
Start: 2017-08-02 | End: 2017-08-04 | Stop reason: HOSPADM

## 2017-08-02 RX ORDER — ACETAMINOPHEN 325 MG/1
975 TABLET ORAL ONCE
Status: COMPLETED | OUTPATIENT
Start: 2017-08-02 | End: 2017-08-02

## 2017-08-02 RX ORDER — TAMSULOSIN HYDROCHLORIDE 0.4 MG/1
0.4 CAPSULE ORAL
Status: DISCONTINUED | OUTPATIENT
Start: 2017-08-02 | End: 2017-08-04 | Stop reason: HOSPADM

## 2017-08-02 RX ORDER — ACETAMINOPHEN 325 MG/1
650 TABLET ORAL EVERY 6 HOURS
Status: DISCONTINUED | OUTPATIENT
Start: 2017-08-02 | End: 2017-08-04 | Stop reason: HOSPADM

## 2017-08-02 RX ORDER — DEXAMETHASONE SODIUM PHOSPHATE 4 MG/ML
10 INJECTION, SOLUTION INTRA-ARTICULAR; INTRALESIONAL; INTRAMUSCULAR; INTRAVENOUS; SOFT TISSUE ONCE
Status: COMPLETED | OUTPATIENT
Start: 2017-08-03 | End: 2017-08-03

## 2017-08-02 RX ORDER — HYDROMORPHONE HYDROCHLORIDE 1 MG/ML
0.5 INJECTION, SOLUTION INTRAMUSCULAR; INTRAVENOUS; SUBCUTANEOUS
Status: ACTIVE | OUTPATIENT
Start: 2017-08-02 | End: 2017-08-03

## 2017-08-02 RX ORDER — FACIAL-BODY WIPES
10 EACH TOPICAL DAILY PRN
Status: DISCONTINUED | OUTPATIENT
Start: 2017-08-04 | End: 2017-08-04 | Stop reason: HOSPADM

## 2017-08-02 RX ORDER — SODIUM CHLORIDE 0.9 % (FLUSH) 0.9 %
5-10 SYRINGE (ML) INJECTION EVERY 8 HOURS
Status: DISCONTINUED | OUTPATIENT
Start: 2017-08-02 | End: 2017-08-02 | Stop reason: HOSPADM

## 2017-08-02 RX ORDER — FENTANYL CITRATE 50 UG/ML
25 INJECTION, SOLUTION INTRAMUSCULAR; INTRAVENOUS
Status: DISCONTINUED | OUTPATIENT
Start: 2017-08-02 | End: 2017-08-02 | Stop reason: HOSPADM

## 2017-08-02 RX ORDER — DIPHENHYDRAMINE HYDROCHLORIDE 50 MG/ML
12.5 INJECTION, SOLUTION INTRAMUSCULAR; INTRAVENOUS AS NEEDED
Status: DISCONTINUED | OUTPATIENT
Start: 2017-08-02 | End: 2017-08-02 | Stop reason: HOSPADM

## 2017-08-02 RX ORDER — OXYCODONE AND ACETAMINOPHEN 7.5; 325 MG/1; MG/1
1-2 TABLET ORAL
Qty: 70 TAB | Refills: 0 | Status: SHIPPED | OUTPATIENT
Start: 2017-08-02 | End: 2018-01-31

## 2017-08-02 RX ORDER — TRAMADOL HYDROCHLORIDE 50 MG/1
50 TABLET ORAL
Qty: 60 TAB | Refills: 0 | Status: SHIPPED | OUTPATIENT
Start: 2017-08-02 | End: 2018-01-31

## 2017-08-02 RX ORDER — ONDANSETRON 2 MG/ML
4 INJECTION INTRAMUSCULAR; INTRAVENOUS AS NEEDED
Status: DISCONTINUED | OUTPATIENT
Start: 2017-08-02 | End: 2017-08-02 | Stop reason: HOSPADM

## 2017-08-02 RX ORDER — ASPIRIN 325 MG
325 TABLET, DELAYED RELEASE (ENTERIC COATED) ORAL 2 TIMES DAILY
Status: DISCONTINUED | OUTPATIENT
Start: 2017-08-02 | End: 2017-08-04 | Stop reason: HOSPADM

## 2017-08-02 RX ORDER — OXYCODONE HYDROCHLORIDE 5 MG/1
5 TABLET ORAL
Qty: 60 TAB | Refills: 0 | Status: SHIPPED | OUTPATIENT
Start: 2017-08-02 | End: 2018-01-31

## 2017-08-02 RX ORDER — DEXAMETHASONE SODIUM PHOSPHATE 4 MG/ML
INJECTION, SOLUTION INTRA-ARTICULAR; INTRALESIONAL; INTRAMUSCULAR; INTRAVENOUS; SOFT TISSUE AS NEEDED
Status: DISCONTINUED | OUTPATIENT
Start: 2017-08-02 | End: 2017-08-02 | Stop reason: HOSPADM

## 2017-08-02 RX ORDER — PROPOFOL 10 MG/ML
INJECTION, EMULSION INTRAVENOUS AS NEEDED
Status: DISCONTINUED | OUTPATIENT
Start: 2017-08-02 | End: 2017-08-02 | Stop reason: HOSPADM

## 2017-08-02 RX ORDER — SODIUM CHLORIDE 0.9 % (FLUSH) 0.9 %
5-10 SYRINGE (ML) INJECTION AS NEEDED
Status: DISCONTINUED | OUTPATIENT
Start: 2017-08-02 | End: 2017-08-04 | Stop reason: HOSPADM

## 2017-08-02 RX ORDER — ACETAMINOPHEN 325 MG/1
650 TABLET ORAL EVERY 6 HOURS
Status: DISCONTINUED | OUTPATIENT
Start: 2017-08-02 | End: 2017-08-02 | Stop reason: SDUPTHER

## 2017-08-02 RX ORDER — SODIUM CHLORIDE 9 MG/ML
125 INJECTION, SOLUTION INTRAVENOUS CONTINUOUS
Status: DISPENSED | OUTPATIENT
Start: 2017-08-02 | End: 2017-08-03

## 2017-08-02 RX ORDER — MIDAZOLAM HYDROCHLORIDE 1 MG/ML
INJECTION, SOLUTION INTRAMUSCULAR; INTRAVENOUS AS NEEDED
Status: DISCONTINUED | OUTPATIENT
Start: 2017-08-02 | End: 2017-08-02 | Stop reason: HOSPADM

## 2017-08-02 RX ORDER — PREGABALIN 75 MG/1
75 CAPSULE ORAL ONCE
Status: COMPLETED | OUTPATIENT
Start: 2017-08-02 | End: 2017-08-02

## 2017-08-02 RX ORDER — OXYCODONE HYDROCHLORIDE 5 MG/1
5 TABLET ORAL
Status: DISCONTINUED | OUTPATIENT
Start: 2017-08-02 | End: 2017-08-02 | Stop reason: HOSPADM

## 2017-08-02 RX ORDER — NALOXONE HYDROCHLORIDE 0.4 MG/ML
0.4 INJECTION, SOLUTION INTRAMUSCULAR; INTRAVENOUS; SUBCUTANEOUS AS NEEDED
Status: DISCONTINUED | OUTPATIENT
Start: 2017-08-02 | End: 2017-08-04 | Stop reason: HOSPADM

## 2017-08-02 RX ORDER — ONDANSETRON 2 MG/ML
4 INJECTION INTRAMUSCULAR; INTRAVENOUS
Status: ACTIVE | OUTPATIENT
Start: 2017-08-02 | End: 2017-08-03

## 2017-08-02 RX ORDER — HYDROCHLOROTHIAZIDE 25 MG/1
25 TABLET ORAL DAILY
Status: DISCONTINUED | OUTPATIENT
Start: 2017-08-03 | End: 2017-08-04 | Stop reason: HOSPADM

## 2017-08-02 RX ORDER — POLYETHYLENE GLYCOL 3350 17 G/17G
17 POWDER, FOR SOLUTION ORAL DAILY
Status: DISCONTINUED | OUTPATIENT
Start: 2017-08-03 | End: 2017-08-04 | Stop reason: HOSPADM

## 2017-08-02 RX ORDER — CEFAZOLIN SODIUM IN 0.9 % NACL 2 G/50 ML
2 INTRAVENOUS SOLUTION, PIGGYBACK (ML) INTRAVENOUS EVERY 8 HOURS
Status: COMPLETED | OUTPATIENT
Start: 2017-08-02 | End: 2017-08-04

## 2017-08-02 RX ORDER — ONDANSETRON 8 MG/1
4 TABLET, ORALLY DISINTEGRATING ORAL
Qty: 30 TAB | Refills: 0 | Status: SHIPPED | OUTPATIENT
Start: 2017-08-02 | End: 2019-02-06

## 2017-08-02 RX ORDER — DIPHENHYDRAMINE HYDROCHLORIDE 50 MG/ML
12.5 INJECTION, SOLUTION INTRAMUSCULAR; INTRAVENOUS
Status: ACTIVE | OUTPATIENT
Start: 2017-08-02 | End: 2017-08-03

## 2017-08-02 RX ORDER — HYDROMORPHONE HYDROCHLORIDE 1 MG/ML
.25-1 INJECTION, SOLUTION INTRAMUSCULAR; INTRAVENOUS; SUBCUTANEOUS
Status: DISCONTINUED | OUTPATIENT
Start: 2017-08-02 | End: 2017-08-02 | Stop reason: HOSPADM

## 2017-08-02 RX ORDER — LIDOCAINE HYDROCHLORIDE 20 MG/ML
INJECTION, SOLUTION EPIDURAL; INFILTRATION; INTRACAUDAL; PERINEURAL AS NEEDED
Status: DISCONTINUED | OUTPATIENT
Start: 2017-08-02 | End: 2017-08-02 | Stop reason: HOSPADM

## 2017-08-02 RX ORDER — LIDOCAINE HYDROCHLORIDE 10 MG/ML
0.1 INJECTION, SOLUTION EPIDURAL; INFILTRATION; INTRACAUDAL; PERINEURAL AS NEEDED
Status: DISCONTINUED | OUTPATIENT
Start: 2017-08-02 | End: 2017-08-02 | Stop reason: HOSPADM

## 2017-08-02 RX ORDER — LIDOCAINE HYDROCHLORIDE 20 MG/ML
JELLY TOPICAL ONCE
Status: COMPLETED | OUTPATIENT
Start: 2017-08-02 | End: 2017-08-02

## 2017-08-02 RX ADMIN — Medication 1 TABLET: at 21:15

## 2017-08-02 RX ADMIN — SODIUM CHLORIDE, SODIUM LACTATE, POTASSIUM CHLORIDE, AND CALCIUM CHLORIDE 100 ML/HR: 600; 310; 30; 20 INJECTION, SOLUTION INTRAVENOUS at 12:17

## 2017-08-02 RX ADMIN — SODIUM CHLORIDE 125 ML/HR: 900 INJECTION, SOLUTION INTRAVENOUS at 18:10

## 2017-08-02 RX ADMIN — FAMOTIDINE 20 MG: 20 TABLET, FILM COATED ORAL at 21:15

## 2017-08-02 RX ADMIN — FENTANYL CITRATE 50 MCG: 50 INJECTION, SOLUTION INTRAMUSCULAR; INTRAVENOUS at 15:22

## 2017-08-02 RX ADMIN — SODIUM CHLORIDE, SODIUM LACTATE, POTASSIUM CHLORIDE, AND CALCIUM CHLORIDE: 600; 310; 30; 20 INJECTION, SOLUTION INTRAVENOUS at 15:44

## 2017-08-02 RX ADMIN — BUPIVACAINE HYDROCHLORIDE 2.6 ML: 7.5 INJECTION, SOLUTION EPIDURAL; RETROBULBAR at 15:30

## 2017-08-02 RX ADMIN — LIDOCAINE HYDROCHLORIDE 40 MG: 20 INJECTION, SOLUTION EPIDURAL; INFILTRATION; INTRACAUDAL; PERINEURAL at 15:49

## 2017-08-02 RX ADMIN — LIDOCAINE HYDROCHLORIDE 10 ML: 20 JELLY TOPICAL at 13:15

## 2017-08-02 RX ADMIN — SODIUM CHLORIDE, SODIUM LACTATE, POTASSIUM CHLORIDE, AND CALCIUM CHLORIDE: 600; 310; 30; 20 INJECTION, SOLUTION INTRAVENOUS at 16:48

## 2017-08-02 RX ADMIN — PROPOFOL 15 MG: 10 INJECTION, EMULSION INTRAVENOUS at 15:49

## 2017-08-02 RX ADMIN — PROPOFOL 15 MCG/KG/MIN: 10 INJECTION, EMULSION INTRAVENOUS at 15:49

## 2017-08-02 RX ADMIN — ATORVASTATIN CALCIUM 10 MG: 10 TABLET, FILM COATED ORAL at 21:16

## 2017-08-02 RX ADMIN — MIDAZOLAM HYDROCHLORIDE 1 MG: 1 INJECTION, SOLUTION INTRAMUSCULAR; INTRAVENOUS at 15:22

## 2017-08-02 RX ADMIN — TAMSULOSIN HYDROCHLORIDE 0.4 MG: 0.4 CAPSULE ORAL at 21:15

## 2017-08-02 RX ADMIN — ACETAMINOPHEN 975 MG: 325 TABLET ORAL at 12:16

## 2017-08-02 RX ADMIN — PREGABALIN 75 MG: 75 CAPSULE ORAL at 12:18

## 2017-08-02 RX ADMIN — CEFAZOLIN 2 G: 1 INJECTION, POWDER, FOR SOLUTION INTRAMUSCULAR; INTRAVENOUS; PARENTERAL at 21:15

## 2017-08-02 RX ADMIN — ACETAMINOPHEN 650 MG: 325 TABLET ORAL at 19:08

## 2017-08-02 RX ADMIN — DEXAMETHASONE SODIUM PHOSPHATE 4 MG: 4 INJECTION, SOLUTION INTRA-ARTICULAR; INTRALESIONAL; INTRAMUSCULAR; INTRAVENOUS; SOFT TISSUE at 16:15

## 2017-08-02 RX ADMIN — CEFAZOLIN 2 G: 1 INJECTION, POWDER, FOR SOLUTION INTRAMUSCULAR; INTRAVENOUS; PARENTERAL at 15:42

## 2017-08-02 RX ADMIN — REGULAR STRENGTH 325 MG: 325 TABLET ORAL at 21:15

## 2017-08-02 NOTE — ANESTHESIA POSTPROCEDURE EVALUATION
Post-Anesthesia Evaluation and Assessment    Patient: Fanta Starks MRN: [id number]  SSN: [id number]    YOB: 1935  Age: 80 y.o. Sex: male       Cardiovascular Function/Vital Signs  Visit Vitals    /44    Pulse 89    Temp 37 °C (98.6 °F)    Resp 14    Ht 5' 5\" (1.651 m)    Wt 63.6 kg (140 lb 3.4 oz)    SpO2 100%    BMI 23.33 kg/m2       Patient is status post spinal anesthesia for Procedure(s):  LEFT TOTAL HIP ARTHROPLASTY DIRECT ANTERIOR. Nausea/Vomiting: None    Postoperative hydration reviewed and adequate. Pain:  Pain Scale 1: Adult Nonverbal Pain Scale (08/02/17 1739)  Pain Intensity 1: 0 (08/02/17 1739)   Managed    Neurological Status:   Neuro (WDL): Exceptions to WDL (08/02/17 1739)  Neuro  LUE Motor Response: Purposeful (08/02/17 1739)  LLE Motor Response: No movement to any stimulus;Numbness; Pharmacologically paralyzed (08/02/17 1739)  RUE Motor Response: Purposeful (08/02/17 1739)  RLE Motor Response: No movement to any stimulus;Numbness; Pharmacologically paralyzed (08/02/17 1739)   At baseline    Mental Status and Level of Consciousness: Arousable    Pulmonary Status:   O2 Device: Nasal cannula (08/02/17 1739)   Adequate oxygenation and airway patent    Complications related to anesthesia: None    Post-anesthesia assessment completed.  No concerns    Signed By: Chilo Fine MD     August 2, 2017

## 2017-08-02 NOTE — ANESTHESIA PROCEDURE NOTES
Spinal Block    Start time: 8/2/2017 3:22 PM  End time: 8/2/2017 3:31 PM  Performed by: Francia Fuentes  Authorized by: Fallon Huang     Pre-procedure:   Indications: at surgeon's request and primary anesthetic  Preanesthetic Checklist: patient identified, risks and benefits discussed, anesthesia consent, site marked, patient being monitored and timeout performed    Timeout Time: 15:22          Spinal Block:   Patient Position:  Seated  Prep Region:  Lumbar  Prep: DuraPrep      Location:  L3-4  Technique:  Single shot        Needle:   Needle Type:  Pencan  Needle Gauge:  25 G  Attempts:  1      Events: CSF confirmed, no blood with aspiration and no paresthesia        Assessment:  Insertion:  Uncomplicated  Patient tolerance:  Patient tolerated the procedure well with no immediate complications

## 2017-08-02 NOTE — H&P (VIEW-ONLY)
PAT Pre-Op History & Physical    Patient: Jayla Wheeler                  MRN: [id number]          SSN: [id number]  YOB: 1935          Age: 80 y.o. Sex: male                Subjective:   Patient is a 80 y.o.  male who presents with history of chronic left hip pain that began about 2 years ago per patient report. 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 Rates his left hip pain 2-10/10. Describes his left hip pain as constant aching pain that radiates from his left groin down his left leg to his foot. States that the pain keeps him up at night as he cannot get comfortable in bed. Has failed steroid joint injections, oral steroids, NSAIDs, and narcotic pain medications. The patient was evaluated in the surgeon's office and it was determined that the most appropriate plan of care is to proceed with surgical intervention. Patient's PCP Haider Ames MD           Past Medical History:   Diagnosis Date    Arthritis     rheumatoid    Autoimmune disease ([facility]     sjogren's syndrome, rheumatoid arhtritis    BPH (benign prostatic hyperplasia)     Cancer ([facility]     skin cancer on ear right    Elevated cholesterol     Hypertension     Ill-defined condition     avascular necrosis left hip    Kidney stone       Past Surgical History:   Procedure Laterality Date    HX HEENT      bliat cataract surgery    HX ORTHOPAEDIC      right rotator cuff repair      Prior to Admission medications    Medication Sig Start Date End Date Taking? Authorizing Provider   ATORVASTATIN CALCIUM (ATORVASTATIN PO) Take 10 mg by mouth nightly. Yes Historical Provider   amLODIPine (NORVASC) 5 mg tablet Take 5 mg by mouth. Yes Historical Provider   tamsulosin (FLOMAX) 0.4 mg capsule Take 0.4 mg by mouth nightly. Yes Historical Provider   enalapril (VASOTEC) 20 mg tablet Take 20 mg by mouth daily.    Yes Historical Provider hydroCHLOROthiazide (HYDRODIURIL) 25 mg tablet Take 25 mg by mouth daily. Yes Historical Provider   ascorbic acid, vitamin C, (VITAMIN C) 1,000 mg tablet Take 1,000 mg by mouth daily. Yes Historical Provider   OMEGA-3 FATTY ACIDS (FISH OIL CONCENTRATE PO) Take 600 mg by mouth daily. Yes Historical Provider   multivitamin, tx-iron-ca-min (THERA-M W/ IRON) 9 mg iron-400 mcg tab tablet Take 1 Tab by mouth daily. Yes Historical Provider   vitamin E (AQUA GEMS) 400 unit capsule Take 400 Units by mouth daily. Yes Historical Provider   aspirin delayed-release 81 mg tablet Take 81 mg by mouth daily. Yes Historical Provider   CHOLECALCIFEROL, VITAMIN D3, (VITAMIN D3 PO) Take 1,000 Units by mouth daily. Yes Historical Provider   HYDROcodone-acetaminophen (NORCO) 5-325 mg per tablet Take 1 Tab by mouth every eight (8) hours as needed for Pain. Yes Historical Provider     Current Outpatient Prescriptions   Medication Sig    ATORVASTATIN CALCIUM (ATORVASTATIN PO) Take 10 mg by mouth nightly.  amLODIPine (NORVASC) 5 mg tablet Take 5 mg by mouth.  tamsulosin (FLOMAX) 0.4 mg capsule Take 0.4 mg by mouth nightly.  enalapril (VASOTEC) 20 mg tablet Take 20 mg by mouth daily.  hydroCHLOROthiazide (HYDRODIURIL) 25 mg tablet Take 25 mg by mouth daily.  ascorbic acid, vitamin C, (VITAMIN C) 1,000 mg tablet Take 1,000 mg by mouth daily.  OMEGA-3 FATTY ACIDS (FISH OIL CONCENTRATE PO) Take 600 mg by mouth daily.  multivitamin, tx-iron-ca-min (THERA-M W/ IRON) 9 mg iron-400 mcg tab tablet Take 1 Tab by mouth daily.  vitamin E (AQUA GEMS) 400 unit capsule Take 400 Units by mouth daily.  aspirin delayed-release 81 mg tablet Take 81 mg by mouth daily.  CHOLECALCIFEROL, VITAMIN D3, (VITAMIN D3 PO) Take 1,000 Units by mouth daily.  HYDROcodone-acetaminophen (NORCO) 5-325 mg per tablet Take 1 Tab by mouth every eight (8) hours as needed for Pain.      No current facility-administered medications for this encounter. Allergies   Allergen Reactions    Other Food Not Reported This Time     Does not eat deer meat or other sausages    Beef Containing Products Anaphylaxis     Reaction after tick bite    Pork Derived (Porcine) Anaphylaxis     Reaction after tick bite    Morphine Other (comments)     unknown      Social History   Substance Use Topics    Smoking status: Former Smoker     Quit date: 1970    Smokeless tobacco: Former User     Quit date: 1980      Comment: smoked cigars      Alcohol use No      History   Drug Use No     Family History   Problem Relation Age of Onset    Diabetes Mother     Arthritis-osteo Father     Diabetes Sister     No Known Problems Brother          Review of Systems    Patient denies difficulty swallowing, mouth sores, or loose teeth. Patient denies any recent dental procedures or any planned prior to surgery. States he is hard of hearing. Patient denies chest pain, tightness, pain radiating down left arm, palpitations. Denies dizziness, visual disturbances, or lightheadedness. Patient denies shortness of breath, wheezing, cough, fever, or chills. Patient denies diarrhea, constipation, or abdominal pain. Patient denies urinary problems including dysuria, hesitancy, or incontinence. C/o urinary urgency and frequency. Denies skin breakdown, insect bites or open area. C/o pruritic rash on his back. C?o left hip pain. Objective:   Patient Vitals for the past 24 hrs:   Temp Pulse Resp BP SpO2   17 1316 97.7 °F (36.5 °C) 93 19 143/69 97 %     Temp (24hrs), Av.7 °F (36.5 °C), Min:97.7 °F (36.5 °C), Max:97.7 °F (36.5 °C)    Body mass index is 19.44 kg/(m^2). Wt Readings from Last 1 Encounters:   17 53 kg (116 lb 13.5 oz)        Physical Exam:     General: Pleasant,  cooperative, no apparent distress, appears stated age. Uses cane to ambulate. Eyes: Conjunctivae/corneas clear. EOMs intact.    Nose: Nares normal.   Mouth/Throat: Lips, mucosa, and tongue normal. Full upper and lower dentures in place. Neck: Supple, symmetrical, trachea midline. Back: Symmetric   Lungs: Clear to auscultation bilaterally. Heart: Regular rate and rhythm, S1, S2 normal. No murmur, click, rub or gallop. Abdomen: Soft, non-tender. Bowel sounds normal. No distention. Musculoskeletal:  Gait is antalgic. Extremities:  Extremities normal, atraumatic, no cyanosis. Trace ankle/pedal edema L>R. Calves                                 supple, non tender to palpation. Pulses: 2+ and symmetric bilateral upper extremities. Cap. refill <2 seconds   Skin: Skin color, texture, turgor normal.  No lesions. Scattered pink rash noted on back. Neurologic: CN II-XII grossly intact. Alert and oriented x3. Labs:   Recent Results (from the past 67 hour(s))   CULTURE, MRSA    Collection Time: 07/19/17  2:07 PM   Result Value Ref Range    Special Requests: NO SPECIAL REQUESTS      Culture result: MRSA NOT PRESENT      Culture result:            Screening of patient nares for MRSA is for surveillance purposes and, if positive, to facilitate isolation considerations in high risk settings. It is not intended for automatic decolonization interventions per se as regimens are not sufficiently effective to warrant routine use. CBC WITH AUTOMATED DIFF    Collection Time: 07/19/17  2:49 PM   Result Value Ref Range    WBC 9.5 4.1 - 11.1 K/uL    RBC 4.29 4. 10 - 5.70 M/uL    HGB 14.7 12.1 - 17.0 g/dL    HCT 41.1 36.6 - 50.3 %    MCV 95.8 80.0 - 99.0 FL    MCH 34.3 (H) 26.0 - 34.0 PG    MCHC 35.8 30.0 - 36.5 g/dL    RDW 12.5 11.5 - 14.5 %    PLATELET 252 935 - 145 K/uL    NEUTROPHILS 57 32 - 75 %    LYMPHOCYTES 31 12 - 49 %    MONOCYTES 10 5 - 13 %    EOSINOPHILS 2 0 - 7 %    BASOPHILS 0 0 - 1 %    ABS. NEUTROPHILS 5.4 1.8 - 8.0 K/UL    ABS. LYMPHOCYTES 2.9 0.8 - 3.5 K/UL    ABS. MONOCYTES 0.9 0.0 - 1.0 K/UL    ABS. EOSINOPHILS 0.2 0.0 - 0.4 K/UL    ABS.  BASOPHILS 0.0 0.0 - 0.1 K/UL METABOLIC PANEL, COMPREHENSIVE    Collection Time: 07/19/17  2:49 PM   Result Value Ref Range    Sodium 136 136 - 145 mmol/L    Potassium 3.3 (L) 3.5 - 5.1 mmol/L    Chloride 96 (L) 97 - 108 mmol/L    CO2 30 21 - 32 mmol/L    Anion gap 10 5 - 15 mmol/L    Glucose 120 (H) 65 - 100 mg/dL    BUN 16 6 - 20 MG/DL    Creatinine 1.29 0.70 - 1.30 MG/DL    BUN/Creatinine ratio 12 12 - 20      GFR est AA >60 >60 ml/min/1.73m2    GFR est non-AA 53 (L) >60 ml/min/1.73m2    Calcium 9.7 8.5 - 10.1 MG/DL    Bilirubin, total 0.4 0.2 - 1.0 MG/DL    ALT (SGPT) 21 12 - 78 U/L    AST (SGOT) 20 15 - 37 U/L    Alk.  phosphatase 117 45 - 117 U/L    Protein, total 8.1 6.4 - 8.2 g/dL    Albumin 4.1 3.5 - 5.0 g/dL    Globulin 4.0 2.0 - 4.0 g/dL    A-G Ratio 1.0 (L) 1.1 - 2.2     C REACTIVE PROTEIN, QT    Collection Time: 07/19/17  2:49 PM   Result Value Ref Range    C-Reactive protein <0.29 <0.60 mg/dL   HEMOGLOBIN A1C WITH EAG    Collection Time: 07/19/17  2:49 PM   Result Value Ref Range    Hemoglobin A1c 5.3 4.2 - 6.3 %    Est. average glucose 105 mg/dL   TYPE & SCREEN    Collection Time: 07/19/17  2:49 PM   Result Value Ref Range    Crossmatch Expiration 08/02/2017     ABO/Rh(D) Maureen Shade POSITIVE     Antibody screen NEG    TRANSFERRIN    Collection Time: 07/19/17  2:49 PM   Result Value Ref Range    Transferrin 229 200 - 370 mg/dL   PROTHROMBIN TIME + INR    Collection Time: 07/19/17  3:03 PM   Result Value Ref Range    INR 1.0 0.9 - 1.1      Prothrombin time 10.0 9.0 - 11.1 sec   PTT    Collection Time: 07/19/17  3:03 PM   Result Value Ref Range    aPTT 29.9 22.1 - 32.5 sec    aPTT, therapeutic range     58.0 - 77.0 SECS   URINALYSIS W/ REFLEX CULTURE    Collection Time: 07/19/17  3:03 PM   Result Value Ref Range    Color YELLOW/STRAW      Appearance CLEAR CLEAR      Specific gravity 1.019 1.003 - 1.030      pH (UA) 6.5 5.0 - 8.0      Protein NEGATIVE  NEG mg/dL    Glucose NEGATIVE  NEG mg/dL    Ketone TRACE (A) NEG mg/dL    Bilirubin NEGATIVE  NEG      Blood NEGATIVE  NEG      Urobilinogen 0.2 0.2 - 1.0 EU/dL    Nitrites NEGATIVE  NEG      Leukocyte Esterase NEGATIVE  NEG      WBC 0-4 0 - 4 /hpf    RBC 0-5 0 - 5 /hpf    Epithelial cells FEW FEW /lpf    Bacteria NEGATIVE  NEG /hpf    UA:UC IF INDICATED CULTURE NOT INDICATED BY UA RESULT CNI      Hyaline cast 2-5 0 - 5 /lpf   EKG, 12 LEAD, INITIAL    Collection Time: 07/19/17  3:14 PM   Result Value Ref Range    Ventricular Rate 93 BPM    Atrial Rate 93 BPM    P-R Interval 164 ms    QRS Duration 92 ms    Q-T Interval 364 ms    QTC Calculation (Bezet) 452 ms    Calculated P Axis -5 degrees    Calculated R Axis 29 degrees    Calculated T Axis -3 degrees    Diagnosis       Normal sinus rhythm  Nonspecific ST abnormality  Abnormal ECG  No previous ECGs available  Confirmed by Nely Miranda MD., Joseph Lemus ([id number]) on 7/19/2017 6:53:30 PM         Assessment:     Left hip arthritis    Plan:     Scheduled for left total hip arthroplasty- direct anterior. Labs, EKG, and CXR done per surgeon's order. Lab results, EKG, and CXR reviewed- unremarkable except Potassium= 3.3, Creatinine= 1.29/ GFR= 53. Documented renal insufficiency POA per [facility] joint protocol. Attended joint class 7/19/2017. Cardiology note/clearance from Dr. Kali Gonzalez reviewed and placed on chart.     Valente James NP

## 2017-08-02 NOTE — PROGRESS NOTES
Primary Nurse Alex Griffin RN and Linus Abad RN performed a dual skin assessment on this patient No impairment noted  Edin score is 18

## 2017-08-02 NOTE — ROUTINE PROCESS
TRANSFER - OUT REPORT:    Verbal report given to Wilmer Silva RN on Mikel Cavanaugh  being transferred to  for routine post - op       Report consisted of patients Situation, Background, Assessment and   Recommendations(SBAR). Information from the following report(s) SBAR was reviewed with the receiving nurse. Lines:   Peripheral IV 08/02/17 Right Forearm (Active)   Site Assessment Clean, dry, & intact 8/2/2017  6:10 PM   Phlebitis Assessment 0 8/2/2017  6:10 PM   Infiltration Assessment 0 8/2/2017  6:10 PM   Dressing Status Clean, dry, & intact 8/2/2017  6:10 PM   Dressing Type Transparent 8/2/2017  6:10 PM   Hub Color/Line Status Green; Infusing 8/2/2017  6:10 PM        Opportunity for questions and clarification was provided.       Patient transported with:   Registered Nurse

## 2017-08-02 NOTE — INTERVAL H&P NOTE
H&P Update:  Sylvia Peña was seen and examined. History and physical has been reviewed. The patient has been examined.  There have been no significant clinical changes since the completion of the originally dated History and Physical.    Signed By: Yanet Edmond MD     August 2, 2017 12:51 PM

## 2017-08-02 NOTE — PROGRESS NOTES
[facility] Dosing Services: 08/02/17  Pepcid dose change per renal P&T protocol  Physician: Dr Camilla Montero    Previous Regimen Pepcid 20 mg po BID   Serum Creatinine Lab Results   Component Value Date/Time    Creatinine 1.29 07/19/2017 02:49 PM      Creatinine Clearance Estimated Creatinine Clearance: 39.1 mL/min (based on Cr of 1.29).    BUN Lab Results   Component Value Date/Time    BUN 16 07/19/2017 02:49 PM         Plan: Changed Pepcid to 20 mg po daily per renal fucntion    Thank you  Patricia Casiano, PharmD  [phone]

## 2017-08-02 NOTE — PERIOP NOTES
Dr Sandhya Sin updated about testing being done. OR times re-assigned; to go about 1430. Ross catheter placed secondary to frequency and his inability to react soon enough before he looses control of his bladder. To be left in for post op day1 and maybe day 2.

## 2017-08-02 NOTE — CONSULTS
Cally Mcfarland MD   [facility] [phone]   Office [phone]  Mobile [phone]   Cardiology Consultation:    Akua Watkins is a 80 y.o. male admitted on 2017  9:47 AM for LEFT HIP ARTHRITIS. I am asked by dr Michael Herrera of Anaesthesia to evaluate for leg edema. NOTE: This patient has two separate [facility] charts -- his past visits with dr Anaid Strickland are contained in a different chart under the same name, , etc.. These need to be merged! IMPRESSION:   1: Recent worsening lower leg edema, Left first and worse, Right more recent and less pronounced. Unclear etiology: DVT seems unlikely with bilateral edema. Possibly recent steroid course caused fluid retention. 2: No apparent heart disease:      Normal echocardiogram and cardiolite 2016, without anything to suggest deterioration. EKG now normal, no change. 3: HBP, Arthritic condition  4: Left hip pain, for surgery  5: Recent 60 day Rx with prednisone, ?finished recently, may need stress steroids. RECOMMENDATIONS / PLAN:   No obvious heart problem. Probably best to R/O DVT with Dopplers. No cardiac obstacle to proceeding and risk appears low. May need extra diuretic post-op. Please call back prn. Will sign off, resee prn, thanks, Blanca Bolivar MD     History of present illness:  Seen by dr Anaid Strickland past few years for HBP. Cardiac testing last November --> no apparent heart disease. No chest complaints.   Noted left lower leg swelling a few weeks ago, also on right more recently, some left calf pain for weeks    Past Medical History:   Diagnosis Date    Arthritis     rheumatoid    Autoimmune disease ([address]     sjogren's syndrome, rheumatoid arhtritis    BPH (benign prostatic hyperplasia)     Cancer ([address]     skin cancer on ear right    Elevated cholesterol     Hypertension     Ill-defined condition     avascular necrosis left hip    Kidney stone       Past Surgical History:   Procedure Laterality Date    HX HEENT      bliat cataract surgery    HX ORTHOPAEDIC      right rotator cuff repair     Family History   Problem Relation Age of Onset    Diabetes Mother     Arthritis-osteo Father     Diabetes Sister     No Known Problems Brother       Social History   Substance Use Topics    Smoking status: Former Smoker     Quit date: 8/1/1970    Smokeless tobacco: Former User     Quit date: 9/1/1980      Comment: smoked cigars      Alcohol use No       Primary care physician:  Ector Poe MD     Medications before admission:  Prescriptions Prior to Admission   Medication Sig    ATORVASTATIN CALCIUM (ATORVASTATIN PO) Take 10 mg by mouth nightly.  amLODIPine (NORVASC) 5 mg tablet Take 5 mg by mouth.  enalapril (VASOTEC) 20 mg tablet Take 20 mg by mouth daily.  hydroCHLOROthiazide (HYDRODIURIL) 25 mg tablet Take 25 mg by mouth daily.  ascorbic acid, vitamin C, (VITAMIN C) 1,000 mg tablet Take 1,000 mg by mouth daily.  OMEGA-3 FATTY ACIDS (FISH OIL CONCENTRATE PO) Take 600 mg by mouth daily.  multivitamin, tx-iron-ca-min (THERA-M W/ IRON) 9 mg iron-400 mcg tab tablet Take 1 Tab by mouth daily.  vitamin E (AQUA GEMS) 400 unit capsule Take 400 Units by mouth daily.  aspirin delayed-release 81 mg tablet Take 81 mg by mouth daily.  CHOLECALCIFEROL, VITAMIN D3, (VITAMIN D3 PO) Take 1,000 Units by mouth daily.  HYDROcodone-acetaminophen (NORCO) 5-325 mg per tablet Take 1 Tab by mouth every eight (8) hours as needed for Pain.  tamsulosin (FLOMAX) 0.4 mg capsule Take 0.4 mg by mouth nightly. Review of systems:  Got a 2month course of prednisone finishing in past week or two. Not apparently on NSAIA. He reports right calf has been tender and painful for a few months. All other systems reviewed and are negative except as above.     Physical Exam:  Visit Vitals    /71 (BP 1 Location: Right arm, BP Patient Position: Post activity)    Pulse 100  Temp 98.4 °F (36.9 °C)    Resp 17    Ht 5' 5\" (1.651 m)    Wt 140 lb 3.4 oz (63.6 kg)    SpO2 100%    BMI 23.33 kg/m2        Appearance: normal habitus    Cardiovascular: normal pulses and heart sounds without murmurs    Lungs: clear    Abdomen: nontender    Extremities: bilateral lower leg edema R > L. Right calf is tender       Laboratory and Imaging have been personally reviewed and are notable for:    EKG: Normal    Labs:    Reviewed.

## 2017-08-02 NOTE — OP NOTES
OPERATIVE REPORT     Admit Date: 8/2/2017  Admit Diagnosis: LEFT HIP ARTHRITIS  Hip arthritis  Preoperative Diagnosis: LEFT HIP ARTHRITIS  Postoperative Diagnosis: LEFT HIP ARTHRITIS    Procedure: Procedure(s):  LEFT TOTAL HIP ARTHROPLASTY DIRECT ANTERIOR  Surgeon: Deysi Ewing MD  Assistant(s): None  Anesthesia: Spinal   Estimated Blood Loss: 100cc  Specimens:   ID Type Source Tests Collected by Time Destination   1 : LEFT HIP ASPIRATION Joint Fluid Joint, Knee CELL COUNT, BODY FLUID, CULTURE, ANAEROBIC AND AEROBIC Deysi Ewing MD 8/2/2017 1617 Microbiology   2 : LEFT HIP CAPSULE Tissue Hip, left CULTURE, ANAEROBIC AND AEROBIC Deysi Ewing MD 8/2/2017 1620 Microbiology      Complications: None        INDICATIONS:    The patient is a 80 y.o., male who has complained of a long history of left hip pain / groin pain. The patient has failed conservative treatment to include medical management / therapy and presents for definitive operative care. Informed consent was obtained including a discussion of the risks and benefits, which include, but are not limited to, bleeding, infection, neurovascular damage, wound complications, pain and stiffness in the hip, periprosthetic loosening, fracture, dislocation and venous thrombo-embolic disease, the patient consented for the procedure. DESCRIPTION OF PROCEDURE:       The proper side and hip were identified and signed in the preoperative holding area. All questions were answered. After adequate anesthesia, [facility] and all bony prominence were well padded. The hip was prepped and draped in sterile fashion. The patient was positioned supine on the operating room table. A direct anterior approach was used through skin and the tensor fascia. The interval between the Tensor Fascia and Sartorius was used and retractors were placed in an atraumatic fashion.  The lateral femoral circumflex artery and vein were identified and coagulated. The proximal and distal capsule was identified and the anterior capsule excised. A standard neck cut was made according to the implant geometry along with a separate cut just below the articular surface to create a small napkin ring. The bone was removed along with the femoral head. Further soft tissue was debrided. Acetabular exposure was then obtained and the labrum was excised along with the foveal contents. Reaming in an anatomic position was then performed starting at 45mm reamer up to the final reamer size. Osteophytes were removed at this point. The acetabulum was copiously irrigated and then the final cup was impacted in place. A liner for the appropriate head size was also impacted in place. Stability of the cup and liner were assessed. The femur was then exposed, residual soft tissue was removed and the box osteotome was used along with the entry reamer to open the canal. The limb was appropriately positioned on the hana table Sequential broaching was started with the zero broach and broached up to the final implant size. Fluoroscopy was used at this time to verify cup inclination, version and the femoral broach size as well as leg lengths. The final implant placed. A final trial was performed to assess leg length and verified fluoroscopically. The final femoral head was then impacted in place. The wound was copiously irrigated and the final stem was placed along with the head which was impacted in place. Stability and leg lengths were assessed again and verified fluroscopically. The final implants were irrigated. The interval between the tensor and Sartorius was closed with 0-Vicryl, the sub-Q with 2-0 Vicryl, 4-0 monocryl and dermabond. A sterile dressing was applied. The patient was awoken from anesthesia and taken to the recovery room in a stable condiition.      OPERATIVE FINDINGS : Severe collapse and AVN of the femoral head    IMPLANTS :     Implant Name Type Inv. Item Serial No.  Lot No. LRB No. Used Action   HIRAM TRITANIUM HERMISPHERICAL CLUSTER HOLE SHELL SIZE 54MM ALPH CODE E   N/A [employer] HOWM [id number] Left 1 Implanted   INSERT ACET 0DEG 36MM E X3 -- TRIDENT - SN/A  INSERT ACET 0DEG 36MM E X3 -- TRIDENT N/A [employer] HOWM [id number] Left 1 Implanted   HIRAM ACCOLADE  DEGREE NECK ANGLE HIP STEM SIZE #4 NK LNTH 35MM STM LNTH 105MM TPR V40   N/A [employer] HOWM [id number] Left 1 Implanted   HEAD FEM DELT V40 -2.5MM 36MM -- V40 BIOLOX - SN/A   HEAD FEM DELT V40 -2.5MM 36MM -- V40 BIOLOX N/A [employer] HOWM [id number] Left 1 Implanted       JUSTIFICATION FOR SURGICAL ASSISTANT:   Surgical Assistant, was requried and necessary in this case, to help with soft tissue retraction, extremity positioning, equiment management, implant management, and wound closure.     Ricardo Montesinos MD

## 2017-08-02 NOTE — ANESTHESIA PREPROCEDURE EVALUATION
Anesthetic History   No history of anesthetic complications            Review of Systems / Medical History  Patient summary reviewed, nursing notes reviewed and pertinent labs reviewed    Pulmonary  Within defined limits                 Neuro/Psych   Within defined limits           Cardiovascular    Hypertension          Hyperlipidemia    Exercise tolerance: >4 METS  Comments: Had been cleared in May for todays hip surgery but since then has bilateral lower leg pitting edema and weight gain. Appreciate cardiology input who feels that this is not a cardiac etiology but perhaps from recent course of steroids.  DVT's should probably be ruled out with dopplers   GI/Hepatic/Renal         Renal disease: stones       Endo/Other        Arthritis     Other Findings   Comments: sjogren's syndrome, rheumatoid arhtritis    avascular necrosis left hip         Physical Exam    Airway  Mallampati: II  TM Distance: 4 - 6 cm  Neck ROM: normal range of motion   Mouth opening: Normal     Cardiovascular  Regular rate and rhythm,  S1 and S2 normal,  no murmur, click, rub, or gallop  Rhythm: regular  Rate: normal         Dental    Dentition: Full upper dentures and Full lower dentures     Pulmonary  Breath sounds clear to auscultation               Abdominal  GI exam deferred       Other Findings            Anesthetic Plan    ASA: 2  Anesthesia type: spinal            Anesthetic plan and risks discussed with: Patient

## 2017-08-02 NOTE — IP AVS SNAPSHOT
[address] 
[phone] Patient: Don Cutler MRN: [id number] Current Discharge Medication List  
  
START taking these medications Dose & Instructions Dispensing Information Comments Morning Noon Evening Bedtime  
 aspirin 325 mg tablet Commonly known as:  ASPIRIN Replaces:  aspirin delayed-release 81 mg tablet Your last dose was: Your next dose is:    
   
   
 Dose:  325 mg Take 1 Tab by mouth two (2) times a day. Quantity:  30 Tab Refills:  0  
     
   
   
   
  
 bisacodyl 10 mg suppository Commonly known as:  DULCOLAX (BISACODYL) Your last dose was: Your next dose is:    
   
   
 Dose:  10 mg Insert 10 mg into rectum daily. Quantity:  2 Suppository Refills:  0  
     
   
   
   
  
 ondansetron 8 mg disintegrating tablet Commonly known as:  ZOFRAN ODT Your last dose was: Your next dose is:    
   
   
 Dose:  4 mg Take 0.5 Tabs by mouth every eight (8) hours as needed for Nausea. Quantity:  30 Tab Refills:  0  
     
   
   
   
  
 oxyCODONE IR 5 mg immediate release tablet Commonly known as:  [place] Allegra Your last dose was: Your next dose is:    
   
   
 Dose:  5 mg Take 1 Tab by mouth every eight (8) hours as needed for Pain. Max Daily Amount: 15 mg. Quantity:  60 Tab Refills:  0  
     
   
   
   
  
 oxyCODONE-acetaminophen 7.5-325 mg per tablet Commonly known as:  PERCOCET 7.5 Your last dose was: Your next dose is:    
   
   
 Dose:  1-2 Tab Take 1-2 Tabs by mouth every four (4) hours as needed for Pain. Max Daily Amount: 12 Tabs. For Post-op Pain Control Quantity:  70 Tab Refills:  0  
     
   
   
   
  
 traMADol 50 mg tablet Commonly known as:  ULTRAM  
   
Your last dose was:     
   
Your next dose is:    
   
   
 Dose:  50 mg  
 Take 1 Tab by mouth every six (6) hours as needed for Pain. Max Daily Amount: 200 mg. Quantity:  60 Tab Refills:  0 CONTINUE these medications which have NOT CHANGED Dose & Instructions Dispensing Information Comments Morning Noon Evening Bedtime  
 amLODIPine 5 mg tablet Commonly known as:  Aneudy Lute Your last dose was: Your next dose is:    
   
   
 Dose:  5 mg Take 5 mg by mouth. Refills:  0  
     
   
   
   
  
 ATORVASTATIN PO Your last dose was: Your next dose is:    
   
   
 Dose:  10 mg Take 10 mg by mouth nightly. Refills:  0  
     
   
   
   
  
 enalapril 20 mg tablet Commonly known as:  Selinda Muse Your last dose was: Your next dose is:    
   
   
 Dose:  20 mg Take 20 mg by mouth daily. Refills:  0  
     
   
   
   
  
 FISH OIL CONCENTRATE PO Your last dose was: Your next dose is:    
   
   
 Dose:  600 mg Take 600 mg by mouth daily. Refills:  0  
     
   
   
   
  
 hydroCHLOROthiazide 25 mg tablet Commonly known as:  HYDRODIURIL Your last dose was: Your next dose is:    
   
   
 Dose:  25 mg Take 25 mg by mouth daily. Refills:  0  
     
   
   
   
  
 multivitamin, tx-iron-ca-min 9 mg iron-400 mcg Tab tablet Commonly known as:  THERA-M w/ IRON Your last dose was: Your next dose is:    
   
   
 Dose:  1 Tab Take 1 Tab by mouth daily. Refills:  0  
     
   
   
   
  
 tamsulosin 0.4 mg capsule Commonly known as:  FLOMAX Your last dose was: Your next dose is:    
   
   
 Dose:  0.4 mg Take 0.4 mg by mouth nightly. Refills:  0  
     
   
   
   
  
 VITAMIN C 1,000 mg tablet Generic drug:  ascorbic acid (vitamin C) Your last dose was: Your next dose is:    
   
   
 Dose:  1000 mg Take 1,000 mg by mouth daily. Refills:  0  
     
   
   
   
  
 VITAMIN D3 PO Your last dose was: Your next dose is:    
   
   
 Dose:  1000 Units Take 1,000 Units by mouth daily. Refills:  0  
     
   
   
   
  
 vitamin E 400 unit capsule Commonly known as:  Avenrosalina [address] Your last dose was: Your next dose is:    
   
   
 Dose:  400 Units Take 400 Units by mouth daily. Refills:  0 STOP taking these medications   
 aspirin delayed-release 81 mg tablet Replaced by:  aspirin 325 mg tablet HYDROcodone-acetaminophen 5-325 mg per tablet Commonly known as:  Bonita Shirley Where to Get Your Medications Information on where to get these meds will be given to you by the nurse or doctor. ! Ask your nurse or doctor about these medications  
  aspirin 325 mg tablet  
 bisacodyl 10 mg suppository  
 ondansetron 8 mg disintegrating tablet  
 oxyCODONE IR 5 mg immediate release tablet  
 oxyCODONE-acetaminophen 7.5-325 mg per tablet  
 traMADol 50 mg tablet

## 2017-08-02 NOTE — PROGRESS NOTES
Bedside and Verbal shift change report given to Asmita Valles RN (oncoming nurse) by Chung Licea RN (offgoing nurse). Report included the following information SBAR, Kardex, OR Summary, Procedure Summary, Intake/Output and MAR.

## 2017-08-02 NOTE — PERIOP NOTES
Patient and need for griffin catheter identified. Pre procedure explanation and understanding verbalized understood. Sterile technique with pre-procedure identification and castle wipes. Griffin placed per procedural protocol without difficulty. Order obtained for UroJet Lido 2% to catheter and urinary meatus for comfort and lubrication. Pt tolerated without discomfort or pain except for initial \"burning\" sensation from UroJet.

## 2017-08-02 NOTE — PROCEDURES
[address]  *** FINAL REPORT ***    Name: Nacho Franco  MRN: [id number]    Inpatient  : 12 Sep 1935  HIS Order #: [id number]  [id number] [facility] Visit #: [id number]  Date: 02 Aug 2017    TYPE OF TEST: Peripheral Venous Testing    REASON FOR TEST  Limb swelling    Right Leg:-  Deep venous thrombosis:           No  Superficial venous thrombosis:    No  Deep venous insufficiency:        No  Superficial venous insufficiency: No    Left Leg:-  Deep venous thrombosis:           No  Superficial venous thrombosis:    No  Deep venous insufficiency:        No  Superficial venous insufficiency: No      INTERPRETATION/FINDINGS  PROCEDURE:  BILATERAL LE VENOUS DUPLEX. Evaluation of lower extremity veins with ultrasound (B-mode imaging,  pulsed Doppler, color Doppler). Includes the common femoral, deep  femoral, femoral, popliteal, posterior tibial, peroneal, and great  saphenous veins. FINDINGS:  Goldy Constant scale and color flow duplex images of the veins in  both lower extremities demonstrate normal compressibility, spontaneous   and augmented flow profiles, and absence of filling defects  throughout the deep and superficial veins in both lower extremities. CONCLUSION:  Bilateral lower extremity venous duplex negative for deep   venous thrombosis or thrombophlebitis. ADDITIONAL COMMENTS    I have personally reviewed the data relevant to the interpretation of  this  study.     TECHNOLOGIST: Ana Luisa Thakur RDCS  Signed: 2017 01:04 PM    PHYSICIAN: Speedy Couch MD  Signed: 2017 07:49 AM

## 2017-08-03 LAB
ANION GAP BLD CALC-SCNC: 4 MMOL/L (ref 5–15)
BUN SERPL-MCNC: 18 MG/DL (ref 6–20)
BUN/CREAT SERPL: 17 (ref 12–20)
CALCIUM SERPL-MCNC: 9.2 MG/DL (ref 8.5–10.1)
CHLORIDE SERPL-SCNC: 104 MMOL/L (ref 97–108)
CO2 SERPL-SCNC: 29 MMOL/L (ref 21–32)
CREAT SERPL-MCNC: 1.09 MG/DL (ref 0.7–1.3)
GLUCOSE SERPL-MCNC: 143 MG/DL (ref 65–100)
HGB BLD-MCNC: 10.6 G/DL (ref 12.1–17)
POTASSIUM SERPL-SCNC: 4.2 MMOL/L (ref 3.5–5.1)
SODIUM SERPL-SCNC: 137 MMOL/L (ref 136–145)

## 2017-08-03 PROCEDURE — 97161 PT EVAL LOW COMPLEX 20 MIN: CPT

## 2017-08-03 PROCEDURE — 85018 HEMOGLOBIN: CPT | Performed by: ORTHOPAEDIC SURGERY

## 2017-08-03 PROCEDURE — 97165 OT EVAL LOW COMPLEX 30 MIN: CPT

## 2017-08-03 PROCEDURE — 74011250636 HC RX REV CODE- 250/636: Performed by: ORTHOPAEDIC SURGERY

## 2017-08-03 PROCEDURE — 74011250637 HC RX REV CODE- 250/637: Performed by: ORTHOPAEDIC SURGERY

## 2017-08-03 PROCEDURE — 65270000029 HC RM PRIVATE

## 2017-08-03 PROCEDURE — 97110 THERAPEUTIC EXERCISES: CPT

## 2017-08-03 PROCEDURE — 97530 THERAPEUTIC ACTIVITIES: CPT

## 2017-08-03 PROCEDURE — 97116 GAIT TRAINING THERAPY: CPT

## 2017-08-03 PROCEDURE — 80048 BASIC METABOLIC PNL TOTAL CA: CPT | Performed by: ORTHOPAEDIC SURGERY

## 2017-08-03 PROCEDURE — 97535 SELF CARE MNGMENT TRAINING: CPT

## 2017-08-03 PROCEDURE — 36415 COLL VENOUS BLD VENIPUNCTURE: CPT | Performed by: ORTHOPAEDIC SURGERY

## 2017-08-03 RX ADMIN — ACETAMINOPHEN 650 MG: 325 TABLET ORAL at 11:41

## 2017-08-03 RX ADMIN — ATORVASTATIN CALCIUM 10 MG: 10 TABLET, FILM COATED ORAL at 21:57

## 2017-08-03 RX ADMIN — ACETAMINOPHEN 650 MG: 325 TABLET ORAL at 05:57

## 2017-08-03 RX ADMIN — REGULAR STRENGTH 325 MG: 325 TABLET ORAL at 18:00

## 2017-08-03 RX ADMIN — ACETAMINOPHEN 650 MG: 325 TABLET ORAL at 23:15

## 2017-08-03 RX ADMIN — TAMSULOSIN HYDROCHLORIDE 0.4 MG: 0.4 CAPSULE ORAL at 21:57

## 2017-08-03 RX ADMIN — FAMOTIDINE 20 MG: 20 TABLET, FILM COATED ORAL at 18:00

## 2017-08-03 RX ADMIN — HYDROCHLOROTHIAZIDE 25 MG: 25 TABLET ORAL at 08:50

## 2017-08-03 RX ADMIN — ACETAMINOPHEN 650 MG: 325 TABLET ORAL at 18:00

## 2017-08-03 RX ADMIN — DEXAMETHASONE SODIUM PHOSPHATE 10 MG: 4 INJECTION, SOLUTION INTRAMUSCULAR; INTRAVENOUS at 08:44

## 2017-08-03 RX ADMIN — AMLODIPINE BESYLATE 5 MG: 5 TABLET ORAL at 09:00

## 2017-08-03 RX ADMIN — SODIUM CHLORIDE 125 ML/HR: 900 INJECTION, SOLUTION INTRAVENOUS at 02:43

## 2017-08-03 RX ADMIN — OXYCODONE HYDROCHLORIDE 5 MG: 5 TABLET ORAL at 09:43

## 2017-08-03 RX ADMIN — Medication 1 TABLET: at 18:00

## 2017-08-03 RX ADMIN — Medication 10 ML: at 21:57

## 2017-08-03 RX ADMIN — CEFAZOLIN 2 G: 1 INJECTION, POWDER, FOR SOLUTION INTRAMUSCULAR; INTRAVENOUS; PARENTERAL at 13:09

## 2017-08-03 RX ADMIN — POLYETHYLENE GLYCOL 3350 17 G: 17 POWDER, FOR SOLUTION ORAL at 08:54

## 2017-08-03 RX ADMIN — CEFAZOLIN 2 G: 1 INJECTION, POWDER, FOR SOLUTION INTRAMUSCULAR; INTRAVENOUS; PARENTERAL at 05:57

## 2017-08-03 RX ADMIN — Medication 1 TABLET: at 08:53

## 2017-08-03 RX ADMIN — ENALAPRIL MALEATE 20 MG: 5 TABLET ORAL at 08:49

## 2017-08-03 RX ADMIN — Medication 10 ML: at 13:09

## 2017-08-03 NOTE — PROGRESS NOTES
Nutrition Assessment:    RECOMMENDATIONS/INTERVENTION(S):   Continue Regular diet     RD to start Ensure Enlive BID - snacks    Monitor PO, labs, wt  ASSESSMENT:   8/3:  Consult received for general nutrition management & supplements. 80 yom admitted for L hip OA. Pmhx includes RA, HTN, high cholesterol. S/p L hip total arthroplasty. Surgical wound, 4+ pitting edema LLE, 4+ RLE; no PI noted. Labs/meds reviewed. , A1C 5.3. Visited pt this morning. Daughter & wife at bedside. Tolerating advanced diet, no n/v & reports 100% PO of breakfast meal.  Eating well PTA - 2 meals, 1-2 snacks/small meals. Ensure trial PTA. Previous intended wt loss per MD order (was 170#, now 140#) - encouraged wt maintenance then 3-5 kg wt gain. Ordering meals & agreeable to Ensure Enlive BID. SUBJECTIVE/OBJECTIVE:     Diet Order: Regular  % Eaten:  No data found. Pertinent Medications: [x] Reviewed - statin, miralax, pepcid, pericolace, compazine, zofran    Chemistries:  Lab Results   Component Value Date/Time    Sodium 137 08/03/2017 01:14 AM    Potassium 4.2 08/03/2017 01:14 AM    Chloride 104 08/03/2017 01:14 AM    CO2 29 08/03/2017 01:14 AM    Anion gap 4 08/03/2017 01:14 AM    Glucose 143 08/03/2017 01:14 AM    BUN 18 08/03/2017 01:14 AM    Creatinine 1.09 08/03/2017 01:14 AM    BUN/Creatinine ratio 17 08/03/2017 01:14 AM    GFR est AA >60 08/03/2017 01:14 AM    GFR est non-AA >60 08/03/2017 01:14 AM    Calcium 9.2 08/03/2017 01:14 AM    AST (SGOT) 20 07/19/2017 02:49 PM    Alk. phosphatase 117 07/19/2017 02:49 PM    Protein, total 8.1 07/19/2017 02:49 PM    Albumin 4.1 07/19/2017 02:49 PM    Globulin 4.0 07/19/2017 02:49 PM    A-G Ratio 1.0 07/19/2017 02:49 PM    ALT (SGPT) 21 07/19/2017 02:49 PM      Anthropometrics: Height: 5' 5\" (165.1 cm) Weight: 63.6 kg (140 lb 3.4 oz)    IBW (%IBW): 68 kg (149 lb 14.6 oz) (93.53 %) UBW (%UBW): 64.9 kg (143 lb) (98.05 %)    BMI: Body mass index is 23.33 kg/(m^2).     This BMI is indicative of:   [] Underweight    [x] Normal - low end per age    [] Overweight    []  Obesity    []  Extreme Obesity (BMI>40)  Estimated Nutrition Needs (Based on): 1897 Kcals/day (RMR (1267) x 1.3 AF + 250) , 74 g (-88 (1.1-1.3 g/kg x IBW)) Protein  Carbohydrate: At Least 130 g/day  Fluids: 1900 mL/day    Last BM: ?, abd WDL   [x]Active     []Hyperactive  []Hypoactive       [] Absent   BS  Skin:    [] Intact   [x] Incision - L hip  [] Breakdown   [] DTI   [] Tears/Excoriation/Abrasion  [x]Edema - 4+ pitting LLE, 4+ RLE [] Other:      Wt Readings from Last 30 Encounters:   08/03/17 63.6 kg (140 lb 3.4 oz)   07/19/17 53 kg (116 lb 13.5 oz)      NUTRITION DIAGNOSES:   Problem:  Increased nutrient needs     Etiology: related to increased need for protein     Signs/Symptoms: as evidenced by impaired skin integrity - surgical wound to L hip, 4+ edema BLE      NUTRITION INTERVENTIONS:  Meals/Snacks: General/healthful diet   Supplements: Commercial supplement     Initial/Brief Nutrition Education: Purpose of nutrition education - post-op nutrition needs        GOAL:   PO intake of meals & ONS >/= 75% in the next 2-4 days    Cultural, Adventist, or Ethnic Dietary Needs: None     LEARNING NEEDS (Diet, Food/Nutrient-Drug Interaction):    [] None Identified   [x] Identified and Education Provided/Documented   [] Identified and Pt declined/was not appropriate      [x] Interdisciplinary Care Plan Reviewed/Documented    [x] Discharge Needs:    See D/C note   [] No Nutrition Related Discharge Needs    NUTRITION RISK:   Pt Is At Nutrition Risk  [x]     No Nutrition Risk Identified  []       PT SEEN FOR:    [x]  MD Consult: []Calorie Count      []Diabetic Diet Education        []Diet Education     []Electrolyte Management     [x]General Nutrition Management and Supplements     []Management of Tube Feeding     []TPN Recommendations    []  RN Referral:  []MST score >=2     []Enteral/Parenteral Nutrition PTA     []Pregnant: Gestational DM or Multigestation                 [] Pressure Ulcer    []  Low BMI      []  Length of Stay       [] Dysphagia Diet         [] Ventilator  []  Follow-up     Previous Recommendations:   [] Implemented          [] Not Implemented          [x] Not Applicable    Previous Goal:   [] Met              [] Progressing Towards Goal              [] Not Progressing Towards Goal   [x] Not Applicable            Eric Prakash, [address] [place] [address]   Pager [phone]

## 2017-08-03 NOTE — PROGRESS NOTES
Problem: Self Care Deficits Care Plan (Adult)  Goal: *Acute Goals and Plan of Care (Insert Text)  Occupational Therapy Goals  Initiated 8/3/2017     1. Patient will perform lower body dressing using AD PRN at supervision/set-up level within 7 days. 2. Patient will perform toilet transfers at supervision/set-up level using Walkers, Type: Rolling Walker within 7 days. 3. Patient will perform all aspects of toileting at supervision/set-up level within 7 days. 4. Patient will demonstrate 3/3 hip precautions without cues within 7 days. OCCUPATIONAL THERAPY EVALUATION  Patient: Kindra Pressley (89 y.o. male)  Date: 8/3/2017  Primary Diagnosis: LEFT HIP ARTHRITIS  Hip arthritis  Procedure(s) (LRB):  LEFT TOTAL HIP ARTHROPLASTY DIRECT ANTERIOR (Left) 1 Day Post-Op   Precautions: WBAT, Fall, Total hip (anterior approach)      ASSESSMENT :  Based on the objective data described below, the patient presents with mild impairment in functional mobility and balance necessary in ADL tasks and transfers s/p L SIGRID POD 1. Nursing cleared for therapy. Patient lives with wife and daughter lives close by. Daughter will be staying with patient for a few days after discharge to assist as needed. Prior to admission he was indep with ADL tasks at cane level. He has severe limitations in B shoulder AROM and his wife offers to assist him however he reports doing as much as he can and using his cane to push the shirt over his head. Patient verbalized 2/3 anterior hip precautions, provided education on precautions in regards to ADL tasks. Provided education on home safety, fall prevention, LB dressing techniques for socks and toileting tasks at RW level. At this time he requires min A at RW level for all standing aspects of dressing, grooming and toileting. Anticipate good progression with OT in hospital.  Recommend one additional OT session for toileting task and training for pants/underwear dressing.       Patient reports recent episode of diplopia which had occurred in the past and he had a patch program from [facility]. Reports good success with that. He denies diplopia currently however encouraged to follow up with his eye care provider. Patient will benefit from skilled intervention to address the above impairments. Patients rehabilitation potential is considered to be Good  Factors which may influence rehabilitation potential include:   [X]                None noted  [ ]                Mental ability/status  [ ]                Medical condition  [ ]                Home/family situation and support systems  [ ]                Safety awareness  [ ]                Pain tolerance/management  [ ]                Other:        PLAN :  Recommendations and Planned Interventions:  [X]                  Self Care Training                  [X]           Therapeutic Activities  [X]                  Functional Mobility Training    [ ]           Cognitive Retraining  [X]                  Therapeutic Exercises           [X]           Endurance Activities  [X]                  Balance Training                   [ ]           Neuromuscular Re-Education  [ ]                  Visual/Perceptual Training     [X]      Home Safety Training  [X]                  Patient Education                 [X]           Family Training/Education  [ ]                  Other (comment):     Frequency/Duration: Patient will be followed by occupational therapy 5 times a week to address goals. Discharge Recommendations: Home Health for tub transfer training vs none  Further Equipment Recommendations for Discharge: possible dressing aides, however patient reports using cane as dressing stick; grab bar at toilet, grab bar in tub shower, installation of elevated toilet(family has, son to put in)       SUBJECTIVE:   Patient stated I just stared feeling old two years ago.   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      OBJECTIVE DATA SUMMARY:   HISTORY:   Past Medical History:   Diagnosis Date    Arthritis       rheumatoid    Autoimmune disease ([facility] [address]       sjogren's syndrome, rheumatoid arhtritis    BPH (benign prostatic hyperplasia)      Cancer (HCC)       skin cancer on ear right    Elevated cholesterol      Hypertension      Ill-defined condition       avascular necrosis left hip    Kidney stone       Past Surgical History:   Procedure Laterality Date    HX HEENT         bliat cataract surgery    HX ORTHOPAEDIC         right rotator cuff repair        Prior Level of Function/Home Situation: Home with wife. Mod indep ADL tasks with cane. Denies recent falls. Reports impaired standing tolerance secondary to L hip pain in standing (<4 mins consecutive). Tub shower, recently has been sponge bathing secondary to inability to get in tub. Home Situation  Home Environment: Private residence  # Steps to Enter: 3  Rails to Enter: No  One/Two Story Residence: One story  Living Alone: No  Support Systems: Spouse/Significant Other/Partner, Family member(s)  Patient Expects to be Discharged to[de-identified] Private residence  Current DME Used/Available at Home: [place], straight, Walker, rolling, Mozella [place], rollator (reacher)  Tub or Shower Type: Shower  [X]  Right hand dominant             [ ]  Left hand dominant     EXAMINATION OF PERFORMANCE DEFICITS:  Cognitive/Behavioral Status:  Neurologic State: Alert  Orientation Level: Oriented X4  Safety/Judgement: Good awareness of safety precautions;Decreased awareness of need for safety     Skin: uppers intact     Edema: uppers none     Hearing:   Auditory  Auditory Impairment: Hard of hearing, bilateral  Hearing Aids/Status: Does not own     Vision/Perceptual:    Diplopia: Yes (hx of double vision- [facility])    Acuity: Within Defined Limits    Corrective Lenses: Glasses     Range of Motion:  AROM: Generally decreased, functional (decreased shoulder flexion bilaterallly and decreased LLE due to surger) Strength:  Strength: Generally decreased, functional (decreased BUE and LLE)        Coordination:  Coordination: Generally decreased, functional  Fine Motor Skills-Upper: Left Intact; Right Intact    Gross Motor Skills-Upper: Left Intact; Right Intact     Tone & Sensation:  Tone: Normal- BUE  Sensation: Intact- BUE and BLE     Balance:  Sitting: Intact  Standing: With support  Standing - Static: Good;Constant support  Standing - Dynamic : Fair     Functional Mobility and Transfers for ADLs:  Bed Mobility:  Supine to Sit:  (received up and left up)  Scooting: Supervision     Transfers:  Sit to Stand: Minimum assistance  Stand to Sit: Minimum assistance  Bed to Chair: Minimum assistance  Toilet Transfer : Minimum assistance; Additional time; Adaptive equipment     ADL Assessment:  Feeding: Independent  Oral Facial Hygiene/Grooming: Minimum assistance  Bathing: Minimum assistance  Upper Body Dressing: Supervision;Setup; Additional time; Adaptive equipment  Lower Body Dressing: Minimum assistance  Toileting: Minimum assistance        ADL Intervention and task modifications:        Lower Body Dressing Assistance  Socks: Supervision/set-up     Cognitive Retraining  Safety/Judgement: Good awareness of safety precautions;Decreased awareness of need for safety     Bathing: Patient instructed when bathing to not submerge wound in water, stand to shower or sponge bathe, cover wound with plastic and tape to ensure no water reaches bandage/wound without cues. Educated on tub bench, daughter reports having one but it would not fit in their bathroom. Recommend grab bar installation in tub. Patient indicated understanding. Dressing joint: Patient instructed to don/doff LLE first/last.  Patient instructed to don all clothing while sitting prior to standing, doff all clothing to knees while standing, then sit to doff clothing off from knees to feet in order to facilitate fall prevention, pain management, and energy conservation. Demonstrated ability to complete doffing and donning L sock with forward flexion. Home safety: Patient instructed on home modifications and safety (raise height of ADL objects, appropriate height of chair surfaces, recliner safety, change of floor surfaces, clear pathways) to increase independence and fall prevention. Patient indicated understanding. Standing: Patient instructed to walk up to sink/counter top/surfaces, step into walker to increase safety of joint and fall prevention. Patient educated about hip anatomy verbally and with pictures and educated to avoid internal rotation of LLE. Instructed to apply concept to ADLs within the home (no reaching across body, square off while using objects, slide objects along surfaces). Patient instructed to increase amount of time standing, observe standing position during ADLs in order to increase even weight bearing through bilateral LEs in order to increase independence with ADLs. Goal to be reached 30 days post - op, per orthopedic surgeon or per PT. Patient indicated understanding. Tub transfer: Patient instructed regarding when it is safe to begin transfer into tub (complete stairs with PT, advance exercises with PT high enough to clear tub height). Patient instructed to use the same technique as used with stairs when entering and exiting tub (\"up with good leg, down with bad leg\"). Patient indicated understanding. Functional Measure:  Barthel Index:      Bathin  Bladder: 0  Bowels: 10  Groomin  Dressin  Feeding: 10  Mobility: 0  Stairs: 0  Toilet Use: 5  Transfer (Bed to Chair and Back): 10  Total: 40         Barthel and G-code impairment scale:  Percentage of impairment CH  0% CI  1-19% CJ  20-39% CK  40-59% CL  60-79% CM  80-99% CN  100%   Barthel Score 0-100 100 99-80 79-60 59-40 20-39 1-19    0   Barthel Score 0-20 20 17-19 13-16 9-12 5-8 1-4 0      The Barthel ADL Index: Guidelines  1.  The index should be used as a record of what a patient does, not as a record of what a patient could do. 2. The main aim is to establish degree of independence from any help, physical or verbal, however minor and for whatever reason. 3. The need for supervision renders the patient not independent. 4. A patient's performance should be established using the best available evidence. Asking the patient, friends/relatives and nurses are the usual sources, but direct observation and common sense are also important. However direct testing is not needed. 5. Usually the patient's performance over the preceding 24-48 hours is important, but occasionally longer periods will be relevant. 6. Middle categories imply that the patient supplies over 50 per cent of the effort. 7. Use of aids to be independent is allowed. Kirsten Rodriguez., Barthel, D.W. (4991). Functional evaluation: the Barthel Index. [address] [facility] (14)2. NANA Villalobos, Nicholas Qureshi., Lenord Shone., Kel[place], [facility] (1999). Measuring the change indisability after inpatient rehabilitation; comparison of the responsiveness of the Barthel Index and Functional [place] Measure. Journal of Neurology, Neurosurgery, and Psychiatry, 66(4), 379-707. Alis Pulido, N.J.A, Minnie Carlisle,  W.J.M, & Julianna Perdomo, M.A. (2004.) Assessment of post-stroke quality of life in cost-effectiveness studies: The usefulness of the Barthel Index and the EuroQoL-5D. Quality of Life Research, 13, 013-42         G codes: In compliance with CMSs Claims Based Outcome Reporting, the following G-code set was chosen for this patient based on their primary functional limitation being treated: The outcome measure chosen to determine the severity of the functional limitation was the Barthel Index with a score of 40/100 which was correlated with the impairment scale.       · Self Care:               - CURRENT STATUS:    CK - 40%-59% impaired, limited or restricted               - GOAL STATUS:           CJ - 20%-39% impaired, limited or restricted               - D/C STATUS:                       ---------------To be determined---------------      Occupational Therapy Evaluation Charge Determination   History Examination Decision-Making   LOW Complexity : Brief history review  MEDIUM Complexity : 3-5 performance deficits relating to physical, cognitive , or psychosocial skils that result in activity limitations and / or participation restrictions MEDIUM Complexity : Patient may present with comorbidities that affect occupational performnce. Miniml to moderate modification of tasks or assistance (eg, physical or verbal ) with assesment(s) is necessary to enable patient to complete evaluation       Based on the above components, the patient evaluation is determined to be of the following complexity level: LOW      Pain:  Reports \"mild achy\" in L hip. Reporting \"It already feels better. \" IN regards to prior surgery. Activity Tolerance:   Fair for toileting task at [address] [place] [address]. Denies dizziness. After treatment:   [X] Patient left in no apparent distress sitting up in chair  [ ] Patient left in no apparent distress in bed  [X] Call bell left within reach  [X] Nursing notified  [X] Caregiver present  [X] Bed alarm activated      COMMUNICATION/EDUCATION:   The patients plan of care was discussed with: Physical Therapist, Registered Nurse and Patient, wife and daughter.  [X]    Home safety education was provided and the patient/caregiver indicated understanding. [X]    Patient/family have participated as able in goal setting and plan of care. [X]    Patient/family agree to work toward stated goals and plan of care. [ ]    Patient understands intent and goals of therapy, but is neutral about his/her participation. [ ]    Patient is unable to participate in goal setting and plan of care. This patients plan of care is appropriate for delegation to [facility].      Thank you for this referral.  Anson Cagle, OT  Time Calculation: 29 mins

## 2017-08-03 NOTE — PROGRESS NOTES
Problem: Mobility Impaired (Adult and Pediatric)  Goal: *Acute Goals and Plan of Care (Insert Text)  Physical Therapy Goals  Initiated 8/3/2017    1. Patient will move from supine to sit and sit to supine in bed with modified independence within 4 days. 2. Patient will perform sit to stand with modified independence within 4 days. 3. Patient will ambulate with modified independence for 100 feet with the least restrictive device within 4 days. 4. Patient will ascend/descend 4 stairs with 1 handrail(s) with minimal assistance/contact guard assist within 4 days. 5. Patient will verbalize and demonstrate understanding of anterior hip precautions per protocol within 4 days. 6. Patient will perform total hip home exercise program per protocol with independence within 4 days. PHYSICAL THERAPY EVALUATION  Patient: Karen Paz (39 y.o. male)  Date: 8/3/2017  Primary Diagnosis: LEFT HIP ARTHRITIS  Hip arthritis  Procedure(s) (LRB):  LEFT TOTAL HIP ARTHROPLASTY DIRECT ANTERIOR (Left) 1 Day Post-Op   Precautions:   WBAT, Fall, Total hip (anterior approach)      ASSESSMENT :  Based on the objective data described below, the patient presents with decreased ROM and strength to LLE, decreased bed mobility, transfers and functional ambulation following left THR, anterior approach. Patient was received seated on edge of bed, several family members present. Patient and family were educated regarding anterior hip precautions, handout provided, and they expressed understanding. Patient was able to stand and ambulate with rolling walker for 15 feet +1 minimal assist, though frequent cues needed to maintain sequence and safety. He is a bit impulsive at times. Patient has BUE decrease shoulder ROM and strength but hands and  strength functional. He has history of right rotator cuff repair. Also has chronic BLE edema but dopplers negative for DVT. Vitals stable. Patient has all needed DME.  He will need HHPT upon discharge. Patient will benefit from skilled intervention to address the above impairments. Patients rehabilitation potential is considered to be Good  Factors which may influence rehabilitation potential include:   [ ]         None noted  [ ]         Mental ability/status  [ ]         Medical condition  [ ]         Home/family situation and support systems  [X]         Safety awareness  [ ]         Pain tolerance/management  [ ]         Other:        PLAN :  Recommendations and Planned Interventions:  [X]           Bed Mobility Training             [ ]    Neuromuscular Re-Education  [X]           Transfer Training                   [ ]    Orthotic/Prosthetic Training  [X]           Gait Training                         [ ]    Modalities  [X]           Therapeutic Exercises           [ ]    Edema Management/Control  [ ]           Therapeutic Activities            [ ]    Patient and Family Training/Education  [ ]           Other (comment):     Frequency/Duration: Patient will be followed by physical therapy  twice daily to address goals. Discharge Recommendations: Home Health  Further Equipment Recommendations for Discharge: none       SUBJECTIVE:   Patient stated They kept waking me up all night long.       OBJECTIVE DATA SUMMARY:   HISTORY:    Past Medical History:   Diagnosis Date    Arthritis       rheumatoid    Autoimmune disease ([facility] [address]       sjogren's syndrome, rheumatoid arhtritis    BPH (benign prostatic hyperplasia)      Cancer (HCC)       skin cancer on ear right    Elevated cholesterol      Hypertension      Ill-defined condition       avascular necrosis left hip    Kidney stone       Past Surgical History:   Procedure Laterality Date    HX HEENT         bliat cataract surgery    HX ORTHOPAEDIC         right rotator cuff repair     Prior Level of Function/Home Situation: was using a cane for last two months; wife assists with don/doff of shirts due to decreased shoulder strength and ROM. Personal factors and/or comorbidities impacting plan of care:      Home Situation  Home Environment: Private residence  # Steps to Enter: 3  Rails to Enter: No  One/Two Story Residence: One story  Living Alone: No  Support Systems: Spouse/Significant Other/Partner, Family member(s)  Patient Expects to be Discharged to[de-identified] Private residence  Current DME Used/Available at Home: [place], straight, Walker, rolling, [place], rollator     EXAMINATION/PRESENTATION/DECISION MAKING:   Critical Behavior:  Neurologic State: Alert  Orientation Level: Oriented X4  Cognition: Appropriate decision making, Appropriate for age attention/concentration, Appropriate safety awareness, Follows commands  Safety/Judgement: Good awareness of safety precautions, Decreased awareness of need for safety  Hearing: Auditory  Auditory Impairment: Hard of hearing, bilateral  Hearing Aids/Status: Does not own  Skin:  Not fully observed  Edema: some chronic edema to BLE; dopplers negative  Range Of Motion:  AROM: Generally decreased, functional (decreased shoulder flexion bilaterallly and decreased LLE due to surger)                       Strength:    Strength: Generally decreased, functional (decreased BUE and LLE)                    Tone & Sensation:   Tone: Normal              Sensation: Intact               Coordination:  Coordination: Generally decreased, functional        Functional Mobility:  Bed Mobility:     Supine to Sit:  (received seated on edge of bed)        Transfers:  Sit to Stand: Minimum assistance  Stand to Sit: Minimum assistance        Bed to Chair: Minimum assistance              Balance:   Sitting: Intact  Standing: Intact; With support  Ambulation/Gait Training:  Distance (ft): 15 Feet (ft)  Assistive Device: Gait belt;Walker, rolling  Ambulation - Level of Assistance: Minimal assistance        Gait Abnormalities: Step to gait; Decreased step clearance     Left Side Weight Bearing: As tolerated           Step Length: Left shortened;Right shortened                                                          Therapeutic Exercises: Ankle pumps           Physical Therapy Evaluation Charge Determination   History Examination Presentation Decision-Making   MEDIUM  Complexity : 1-2 comorbidities / personal factors will impact the outcome/ POC  LOW Complexity : 1-2 Standardized tests and measures addressing body structure, function, activity limitation and / or participation in recreation  LOW Complexity : Stable, uncomplicated  LOW Complexity : FOTO score of       Based on the above components, the patient evaluation is determined to be of the following complexity level: LOW      Pain:      None at start of PT but some increase with walking; asked for pain medication and nursing notified. Activity Tolerance:   Good. Please refer to the flowsheet for vital signs taken during this treatment. After treatment:   [X]         Patient left in no apparent distress sitting up in chair  [ ]         Patient left in no apparent distress in bed  [X]         Call bell left within reach  [X]         Nursing notified  [X]         Caregiver present  [X]         Chair alarm activated      COMMUNICATION/EDUCATION:   The patients plan of care was discussed with: Registered Nurse.  [X]         Fall prevention education was provided and the patient/caregiver indicated understanding. [ ]         Patient/family have participated as able in goal setting and plan of care. [X]         Patient/family agree to work toward stated goals and plan of care. [ ]         Patient understands intent and goals of therapy, but is neutral about his/her participation. [ ]         Patient is unable to participate in goal setting and plan of care.      Thank you for this referral.  Pete Spring, PT   Time Calculation: 28 mins

## 2017-08-03 NOTE — PROGRESS NOTES
08/03/17 10:10 AM  Patient accepted by Andrea Omalley, liaison will visit with patient today. 08/03/17 9:25 AM  CM spoke with patient to complete initial assessment and to begin discharge planning. Demographics were reviewed and confirmed; patient lives with his wife Brionna Schneider in a one level home with 2 steps to enter. DME includes a RW and cane. Has not used HH in past.  PTA, was independent with ADLs and drove himself. Has rx coverage and uses [facility] in [place], [place] to fill scripts. Discussed need for HH, chose [facility]. Referral sent in [facility] [employer]. Care Management Interventions  PCP Verified by CM:  Yes  Transition of Care Consult (CM Consult): [address]: No  Reason Outside [place]: Out of service area  Physical Therapy Consult: Yes  Occupational Therapy Consult: Yes  Current Support Network: Lives with Spouse  Confirm Follow Up Transport: Family  Plan discussed with Pt/Family/Caregiver: Yes  Freedom of Choice Offered: Yes  Discharge Location  Discharge Placement: Home with home health  HESHAM Anton

## 2017-08-03 NOTE — INTERDISCIPLINARY ROUNDS
[address]    Patient Information    Name: Miguel Wheat  Age: 80 y.o. Admission Date: 8/2/2017  Surgery/Procedure: Procedure(s):  LEFT TOTAL HIP ARTHROPLASTY DIRECT ANTERIOR  Attending Provider: Carren Blizzard, MD  Surgeon: Marv Motley   Problem List: Active Problems:    Hip arthritis (8/2/2017)      During rounds the following quality care indicators and evidence based practices were addressed :       PT/OT: Patient mobility - Walked around bed, needed frequent cues. Bed Mobility Training  Supine to Sit:  (received up and left up)  Scooting: Supervision  Transfer Training  Sit to Stand: Minimum assistance  Stand to Sit: Minimum assistance  Bed to Chair: Minimum assistance      Gait Training  Assistive Device: Gait belt, Walker, rolling  Ambulation - Level of Assistance: Minimal assistance  Distance (ft): 15 Feet (ft)   Weight Bearing Status  Left Side Weight Bearing: As tolerated      Pain Assessment  Pain Intensity 1: 0 (08/02/17 1814)  Pain Location 1: Leg, Hip     Patient Stated Pain Goal: 5    Pain meds: oxycodone  Antibiotics completed: Yes  Anticoagulation:  ASA (Cannot take Lovenox - allergic to pork)    SCDs: in place  Bowels:     RRAT Score:      Readmit Risk Tool  Support Systems: Spouse/Significant Other/Partner, Family member(s)  Relationship with Primary Physician Group: Seen at least one time within the past 6 months    Discharge Management/Planning:    Readmit Risk Assessment Information:   Low Risk            10       Total Score        3 Has Seen PCP in Last 6 Months (Yes=3, No=0)    2 . Living with Significant Other. Assisted Living. LTAC. SNF. or   Rehab    5 Pt.  Coverage (Medicare=5 , Medicaid, or Self-Pay=4)        Criteria that do not apply:    Patient Length of Stay (>5 days = 3)    IP Visits Last 12 Months (1-3=4, 4=9, >4=11)    Charlson Comorbidity Score (Age + Comorbid Conditions)         Readmit Risk Tool  Support Systems: Spouse/Significant Other/Partner, Family member(s)  Relationship with Primary Physician Group: Seen at least one time within the past 6 months    [facility]:     Anticipated Date of Discharge: tomorrow    Interdisciplinary team rounds were held with the following team members: Nurse Practitioner, Case Management, Nursing, Pharmacy, and Rehab. See clinical pathway and/or care plan for interventions and desired outcomes.

## 2017-08-03 NOTE — PROGRESS NOTES
TOTAL HIP ARTHROPLASTY DAILY NOTE     ASSESSMENT / PLAN :   1. Pain Control : Excellent - Able to sleep and participate with therapy  2. Overnight Issues : none  3. Wound or incisional issue : Healing incision with no visible drainage  4. Therapy / Weight Bearing Recommendations : Weight bear as tolerated with use of a walker and two person assist while mobilizing  5. DVT Prophylaxis : Mechanical and Lovenox and mechanical lower extremity compression device  6. Disposition : Discharge to home with home health (maximum of 4 visits)  7. Medical Concerns : Neg duplex preop, will use Lovenox  8. Comments : Stable       POD  1 Day Post-Op s/p Procedure(s):  LEFT TOTAL HIP ARTHROPLASTY DIRECT ANTERIOR     SUBJECTIVE :     Concerns : None. OBJECTIVE :     Vitals:    08/02/17 2100 08/02/17 2151 08/03/17 0056 08/03/17 0332   BP: 142/68 126/71 128/62 133/67   Pulse: 94 83 77 80   Resp: 15 16 17 16   Temp: 98.4 °F (36.9 °C) 97.8 °F (36.6 °C) 97.6 °F (36.4 °C) 98.1 °F (36.7 °C)   SpO2: 95% 96% 97% 95%   Weight:       Height:           Alert and oriented x3. left exam of the hip reveals that the dressing is clean, dry and intact. The patient is able to fire the quadriceps / flex at the hip  Sensation is intact to light touch. No calf pain.      Labs:  Recent Labs      08/03/17   0114   HGB  10.6*   NA  137   K  4.2   CL  104   CO2  29   BUN  18   CREA  1.09   GLU  143*        Patient mobility           Irene Tejada MD  Cell [phone]  Medical Staff : Josemanuel Burns / Nadine Lima  Office : [phone] ext  [phone]

## 2017-08-03 NOTE — PROGRESS NOTES
Problem: Mobility Impaired (Adult and Pediatric)  Goal: *Acute Goals and Plan of Care (Insert Text)  Physical Therapy Goals  Initiated 8/3/2017    1. Patient will move from supine to sit and sit to supine in bed with modified independence within 4 days. 2. Patient will perform sit to stand with modified independence within 4 days. 3. Patient will ambulate with modified independence for 100 feet with the least restrictive device within 4 days. 4. Patient will ascend/descend 4 stairs with 1 handrail(s) with minimal assistance/contact guard assist within 4 days. 5. Patient will verbalize and demonstrate understanding of anterior hip precautions per protocol within 4 days. 6. Patient will perform total hip home exercise program per protocol with independence within 4 days. PHYSICAL THERAPY TREATMENT  Patient: Akua Watkins (82 y.o. male)  Date: 8/3/2017  Diagnosis: LEFT HIP ARTHRITIS  Hip arthritis <principal problem not specified>  Procedure(s) (LRB):  LEFT TOTAL HIP ARTHROPLASTY DIRECT ANTERIOR (Left) 1 Day Post-Op  Precautions: WBAT, Fall, Total hip (anterior approach)  ASSESSMENT:  Patient was received in bed alert and willing to work with PT. Patient has poor recall of anterior hip precautions. All were reviewed with patient and his daughter. Patient was instructed in anterior hip home exercise program and written instructions provided. He stood and ambulated 40 feet with rolling walker +1 min/mod assist. Patient needs nearly constant cues to maintain sequence and for safety practices. Impulsive with decreased safety awareness. Progression toward goals:  [ ]      Improving appropriately and progressing toward goals  [X]      Improving slowly and progressing toward goals  [ ]      Not making progress toward goals and plan of care will be adjusted       PLAN:  Patient continues to benefit from skilled intervention to address the above impairments.   Continue treatment per established plan of care.  Discharge Recommendations:  Home Health  Further Equipment Recommendations for Discharge:  None noted       SUBJECTIVE:   \"I am doing ok. I ate too much. \"      OBJECTIVE DATA SUMMARY:   Functional Mobility Training:  Bed Mobility:     Supine to Sit: Additional time;Contact guard assistance     Scooting: Supervision        Transfers:  Sit to Stand: Minimum assistance  Stand to Sit: Minimum assistance        Bed to Chair: Minimum assistance                    Ambulation/Gait Training:  Distance (ft): 40 Feet (ft)  Assistive Device: Gait belt;Walker, rolling  Ambulation - Level of Assistance: Minimal assistance; Moderate assistance        Gait Abnormalities: Step to gait; Path deviations     Left Side Weight Bearing: As tolerated           Step Length: Left shortened;Right shortened                                           Therapeutic Exercises:   Exercises performed per protocol. See morning treatment note for description. Pain:   3/10 to left hip                 Activity Tolerance:   Good but constant cues for safety. Please refer to the flowsheet for vital signs taken during this treatment.   After treatment:   [X] Patient left in no apparent distress sitting up in chair  [ ] Patient left in no apparent distress in bed  [X] Call bell left within reach  [X] Nursing notified  [X] Caregiver present  [X] Chair alarm activated      COMMUNICATION/COLLABORATION:   The patients plan of care was discussed with: Registered Nurse     Flako Peaceo, PT   Time Calculation: 25 mins

## 2017-08-04 VITALS
SYSTOLIC BLOOD PRESSURE: 152 MMHG | DIASTOLIC BLOOD PRESSURE: 75 MMHG | WEIGHT: 140.21 LBS | TEMPERATURE: 97.2 F | HEIGHT: 65 IN | RESPIRATION RATE: 18 BRPM | BODY MASS INDEX: 23.36 KG/M2 | HEART RATE: 81 BPM | OXYGEN SATURATION: 97 %

## 2017-08-04 LAB — HGB BLD-MCNC: 9.9 G/DL (ref 12.1–17)

## 2017-08-04 PROCEDURE — 85018 HEMOGLOBIN: CPT | Performed by: ORTHOPAEDIC SURGERY

## 2017-08-04 PROCEDURE — 97116 GAIT TRAINING THERAPY: CPT

## 2017-08-04 PROCEDURE — 97535 SELF CARE MNGMENT TRAINING: CPT

## 2017-08-04 PROCEDURE — 74011250637 HC RX REV CODE- 250/637: Performed by: ORTHOPAEDIC SURGERY

## 2017-08-04 PROCEDURE — 51798 US URINE CAPACITY MEASURE: CPT

## 2017-08-04 PROCEDURE — 36415 COLL VENOUS BLD VENIPUNCTURE: CPT | Performed by: ORTHOPAEDIC SURGERY

## 2017-08-04 PROCEDURE — 97110 THERAPEUTIC EXERCISES: CPT

## 2017-08-04 RX ADMIN — Medication 1 TABLET: at 09:05

## 2017-08-04 RX ADMIN — ACETAMINOPHEN 650 MG: 325 TABLET ORAL at 12:34

## 2017-08-04 RX ADMIN — Medication 10 ML: at 05:47

## 2017-08-04 RX ADMIN — REGULAR STRENGTH 325 MG: 325 TABLET ORAL at 09:05

## 2017-08-04 RX ADMIN — Medication 10 ML: at 16:50

## 2017-08-04 RX ADMIN — ACETAMINOPHEN 650 MG: 325 TABLET ORAL at 05:47

## 2017-08-04 RX ADMIN — POLYETHYLENE GLYCOL 3350 17 G: 17 POWDER, FOR SOLUTION ORAL at 09:05

## 2017-08-04 RX ADMIN — HYDROCHLOROTHIAZIDE 25 MG: 25 TABLET ORAL at 09:05

## 2017-08-04 RX ADMIN — ENALAPRIL MALEATE 20 MG: 5 TABLET ORAL at 09:05

## 2017-08-04 RX ADMIN — OXYCODONE HYDROCHLORIDE 5 MG: 5 TABLET ORAL at 09:06

## 2017-08-04 RX ADMIN — AMLODIPINE BESYLATE 5 MG: 5 TABLET ORAL at 09:05

## 2017-08-04 NOTE — INTERDISCIPLINARY ROUNDS
[address]    Patient Information    Name: Raymundo Pool  Age: 80 y.o. Admission Date: 8/2/2017  Surgery/Procedure: Procedure(s):  LEFT TOTAL HIP ARTHROPLASTY DIRECT ANTERIOR  Attending Provider: Kesha Lange MD  Surgeon: Christopher Euceda   Problem List: Active Problems:    Hip arthritis (8/2/2017)      During rounds the following quality care indicators and evidence based practices were addressed :       PT/OT: Patient mobility - patient cleared for home discharge  Bed Mobility Training  Supine to Sit: Additional time, Contact guard assistance  Scooting: Supervision  Transfer Training  Sit to Stand: Contact guard assistance  Stand to Sit: Stand-by asssistance, Additional time  Bed to Chair: Minimum assistance      Gait Training  Assistive Device: Walker, rolling, Gait belt  Ambulation - Level of Assistance: Supervision, Additional time  Distance (ft): 100 Feet (ft)  Stairs - Level of Assistance: Contact guard assistance, Additional time, Assist X1  Number of Stairs Trained: 5  Rail Use: Left  (and cane)   Weight Bearing Status  Left Side Weight Bearing: As tolerated      Pain Assessment  Pain Intensity 1: 3 (08/04/17 0414)  Pain Location 1: Leg, Hip  Pain Intervention(s) 1: Refused  Patient Stated Pain Goal: 3    Pain meds: tylenol, oxycodone  Antibiotics completed: Yes  Anticoagulation:  ASA BID  Bowel regimen: miralax, pericolace  Mechanical DVT prophylaxis: SCDs, will go home with TEDs  Ross: N   Goals For Today: Progress with PT, pain control    RRAT Score:      Readmit Risk Tool  Support Systems: Spouse/Significant Other/Partner, Family member(s)  Relationship with Primary Physician Group: Seen at least one time within the past 6 months    Discharge Management/Planning:    Readmit Risk Assessment Information:   Low Risk            10       Total Score        3 Has Seen PCP in Last 6 Months (Yes=3, No=0)    2 . Living with Significant Other. Assisted Living. LTAC. SNF. or   Rehab    5 Pt. Coverage (Medicare=5 , Medicaid, or Self-Pay=4)        Criteria that do not apply:    Patient Length of Stay (>5 days = 3)    IP Visits Last 12 Months (1-3=4, 4=9, >4=11)    Charlson Comorbidity Score (Age + Comorbid Conditions)         Readmit Risk Tool  Support Systems: Spouse/Significant Other/Partner, Family member(s)  Relationship with Primary Physician Group: Seen at least one time within the past 6 months    [facility]: [address] [facility]  Anticipated Date of Discharge: today  Barrier to Discharge (if any): awaiting patient to void post griffin removal    Interdisciplinary team rounds were held with the following team members: Nurse Practitioner, Nursing, Pharmacy, and Rehab. See clinical pathway and/or care plan for interventions and desired outcomes.

## 2017-08-04 NOTE — ROUTINE PROCESS
Orthopedic & Surgical Nursing Communication Tool      2:51 AM  8/4/2017     Bedside and Verbal shift change report given to Larissa Christensen RN (incoming nurse) by Jim Mullins RN (outgoing nurse) on Meir Hard a 80 y.o. male who was admitted on 8/2/2017  9:47 AM. Report included the following information SBAR, Procedure Summary, Intake/Output and MAR. Opportunity for questions and clarifications were given to the incoming nurse. Patient's bed is in low position, side rails x3, bed alarm on, call bell within reach and patient not in distress. Hourly rounding performed throughout shift.     Jim Mullins RN

## 2017-08-04 NOTE — PROGRESS NOTES
Problem: Falls - Risk of  Goal: *Absence of falls  Outcome: Progressing Towards Goal  No falls during admission  Goal: *Knowledge of fall prevention  Outcome: Progressing Towards Goal  Calls for assistance

## 2017-08-04 NOTE — PROGRESS NOTES
Bedside shift change report given to Christine Tai RN (oncoming nurse) by Matti Bland RN (offgoing nurse). Report included the following information SBAR, Kardex, Procedure Summary, Intake/Output, MAR and Recent Results.

## 2017-08-04 NOTE — PROGRESS NOTES
Problem: Mobility Impaired (Adult and Pediatric)  Goal: *Acute Goals and Plan of Care (Insert Text)  Physical Therapy Goals  Initiated 8/3/2017    1. Patient will move from supine to sit and sit to supine in bed with modified independence within 4 days. 2. Patient will perform sit to stand with modified independence within 4 days. 3. Patient will ambulate with modified independence for 100 feet with the least restrictive device within 4 days. 4. Patient will ascend/descend 4 stairs with 1 handrail(s) with minimal assistance/contact guard assist within 4 days. 5. Patient will verbalize and demonstrate understanding of anterior hip precautions per protocol within 4 days. 6. Patient will perform total hip home exercise program per protocol with independence within 4 days. PHYSICAL THERAPY TREATMENT/DISCHARGE  Patient: Fanta Starks (69 y.o. male)  Date: 8/4/2017  Diagnosis: LEFT HIP ARTHRITIS  Hip arthritis <principal problem not specified>  Procedure(s) (LRB):  LEFT TOTAL HIP ARTHROPLASTY DIRECT ANTERIOR (Left) 2 Days Post-Op  Precautions: WBAT, Fall, Total hip (anterior approach)      ASSESSMENT:  Patient's daughter and wife present throughout treatment. Daughter states that she will be staying with her dad at least a week after discharge. Reviewed written hip protocol exercises and hip precautions and demonstrated hip precautions during functional movement. Reviewed best chair options at home and bed setup. Performed sit <> stand from chair and wheelchair X 3 with cues for hand placement and use of rails. Performed gait training with rolling walker in hallway and performed stair training with one rail and cane. Needs constant cueing for sequencing on stairs - daughter present and able to assist father. Reviewed car transfers. Goals met. Patient is cleared from a mobility standpoint for discharge home today. Daughter aware patient will need continue cues to maintain hip precautions. Progression toward goals:  [X]      Improving appropriately and progressing toward goals  [ ]      Improving slowly and progressing toward goals  [ ]      Not making progress toward goals and plan of care will be adjusted       PLAN:  Patient will be discharged from physical therapy at this time. Rationale for discharge:  [X] Goals Achieved  [ ] Evie Anderson  [ ] Patient not participating in therapy  [ ] Other:  Discharge Recommendations:  Home Health  Further Equipment Recommendations for Discharge:  rolling walker - aready has from home       SUBJECTIVE:   Patient stated I switch which foot goes up [first on the stairs].       OBJECTIVE DATA SUMMARY:   Critical Behavior:  Neurologic State: Alert  Orientation Level: Oriented to person, Oriented to place  Cognition: Follows commands  Safety/Judgement: Good awareness of safety precautions, Decreased awareness of need for safety  Functional Mobility Training:  Bed Mobility:                    Transfers:  Sit to Stand: Contact guard assistance  Stand to Sit: Stand-by asssistance; Additional time                             Balance:  Sitting: Intact  Standing: Intact; With support  Ambulation/Gait Training:  Distance (ft): 100 Feet (ft)  Assistive Device: Walker, rolling;Gait belt  Ambulation - Level of Assistance: Supervision; Additional time        Gait Abnormalities: Step to gait                                                 Stairs:  Number of Stairs Trained: 5  Stairs - Level of Assistance: Contact guard assistance; Additional time;Assist X1              Rail Use: Left  (and cane)     Therapeutic Exercises:   Reviewed written hip protocol exercises  Pain:  Pain Scale 1: Numeric (0 - 10)  Pain Intensity 1: 3              Activity Tolerance:   No complaints of dizziness or nausea with walking  Please refer to the flowsheet for vital signs taken during this treatment.   After treatment:   [X] Patient left in no apparent distress sitting up in chair  [ ] Patient left in no apparent distress in bed  [X] Call bell left within reach  [X] Nursing notified  [X] Caregiver present  [X] Chair alarm activated      COMMUNICATION/COLLABORATION:   The patients plan of care was discussed with: Certified Occupational Therapy Assistant and Registered Nurse     Heraclio Beard, PT   Time Calculation: 42 mins

## 2017-08-04 NOTE — PROGRESS NOTES
Problem: Self Care Deficits Care Plan (Adult)  Goal: *Acute Goals and Plan of Care (Insert Text)  Occupational Therapy Goals  Initiated 8/3/2017     1. Patient will perform lower body dressing using AD PRN at supervision/set-up level within 7 days. 2. Patient will perform toilet transfers at supervision/set-up level using Walkers, Type: Rolling Walker within 7 days. 3. Patient will perform all aspects of toileting at supervision/set-up level within 7 days. 4. Patient will demonstrate 3/3 hip precautions without cues within 7 days. OCCUPATIONAL THERAPY TREATMENT  Patient: Sadie Vasquez (21 y.o. male)  Date: 8/4/2017  Diagnosis: LEFT HIP ARTHRITIS  Hip arthritis <principal problem not specified>  Procedure(s) (LRB):  LEFT TOTAL HIP ARTHROPLASTY DIRECT ANTERIOR (Left) 2 Days Post-Op  Precautions: WBAT, Fall, Total hip (anterior approach)  Chart, occupational therapy assessment, plan of care, and goals were reviewed. ASSESSMENT:  Pt seen for dressing task sitting in chair. He was able to don pants and shirt with set-up. He knew to don surgical LE first. Pt needing verbal cueing as to anterior precautions however wife and daugther aware of them and will be with pt . His wife and daughter present for treatment and verbalized they are available to assist as needed. Discussed DME for home and family verbalized they have access to a high commode seat and are going to install grab bars by the commode as well as in the tub/shower. Contact guard recommended during sit to stand. Pt clear from an OT standpoint and recommend home health. Progression toward goals:  [X]          Improving appropriately and progressing toward goals  [ ]          Improving slowly and progressing toward goals  [ ]          Not making progress toward goals and plan of care will be adjusted       PLAN:  Patient continues to benefit from skilled intervention to address the above impairments.   Continue treatment per established plan of care.  Discharge Recommendations:  Home health  Further Equipment Recommendations for Discharge: Nicoleb bars       SUBJECTIVE:   Patient stated I have been dealing with hip pain for a long time.       OBJECTIVE DATA SUMMARY:   Cognitive/Behavioral Status:  Neurologic State: Alert  Orientation Level: Oriented to person;Oriented to place  Cognition: Follows commands           Functional Mobility and Transfers for ADLs:              Bed Mobility:      Not tested as pt already up in the chair. Transfers:  Sit to Stand: Contact guard assistance from chair to walker. Balance: Intact sitting balance. ADL Intervention:           Upper Body Dressing Assistance  Dressing Assistance: Supervision/set-up  Pullover Shirt: Supervision/set-up     Lower Body Dressing Assistance  Pants With Button/Zipper: Supervision/set-up  Leg Crossed Method Used: No  Position Performed: Seated in chair   Pt already has depends on. Pain:  Pain Scale 1: Numeric (0 - 10)  Pain Intensity 1: 3                 Activity Tolerance:    No signs/symptoms of distress or discomfort during OT. Please refer to the flowsheet for vital signs taken during this treatment.   After treatment:   [X]  Patient left in no apparent distress sitting up in chair  [ ]  Patient left in no apparent distress in bed  [X]  Call bell left within reach  [ ]  Nursing notified  [X]  Caregiver present  [X]  Chair alarm activated      COMMUNICATION/COLLABORATION:   The patients plan of care was discussed with: Physical Therapist and Occupational Therapist     EMILIE Montes  Time Calculation: 15 mins

## 2017-08-04 NOTE — DISCHARGE INSTRUCTIONS
TOTAL HIP DISCHARGE INSTRUCTIONS    Patient: Miguel Wheat MRN: [id number]  SSN: [id number]              Please take the time to review the following instructions before you leave the hospital and use them as guidelines during your recovery from surgery. If you have any questions you may contact my office at [phone]. After hours or during the weekend you may reach me personally at [phone] if there is an emergency. SPECIAL INSTRUCTIONS :   1. Do not bend greater than 90 degrees at the hip for 4 weeks following your discharge  2. Avoid exercises or activities which bring the leg out or away from the mid-line of the body. The surgical repair involves this muscle and it will require 4 weeks to heal. You may disregard these instructions for a direct anterior approach. 3. You may walk as tolerated and are encouraged to work daily on progressing your activities with a walker initially. 4. You may transition to a cane for walking 5-7 days from surgery once you feel safe. You may use a walker for longer periods if you feel unstable. Wound Care/ Dressing Changes:      DRESSING :     Honey Comb Dressing : This may be removed to shower at 72 hours. This is used only in the hospital. Other dressing options can be purchased over the counter at a local pharmacy or medical supply vendor. A porous adhesive dressing such as pictured above can be purchased at a local [employer] or [employer]. You only need to keep the incision covered for 7 days after showers. A dressing may be used for longer if there are issues with clothing clinging to the incision. Showering/ Bathing: If your incision is dry without drainage you may shower following your discharge home. Your dressing should be removed for showering. It is fine to have water run over the incision. Do not vigorously scrub your incision. Apply a clean, dry dressing after you have dried your incision.   Do not take a bath or get into a swimming pool / Marybelle Poor until you follow up with Dr. Mariana Goldberg. Do not soak your incision under water. If there is continued drainage or you are concerned contact Dr Jo Moreland office prior to showering [phone] ext [phone]. Showering/ Bathing: If your incision is dry without drainage you may shower following your discharge home. Your dressing should be removed for showering. It is fine to have water run over the incision. Do not vigorously scrub your incision. Apply a clean, dry dressing after you have dried your incision. Do not take a bath or get into a swimming pool / Marybelle Poor until you follow up with Dr. Mariana Goldberg. Do not soak your incision under water. If there is continued drainage or you are concerned contact Dr Jo Moreland office prior to showering [phone] ext [phone]. Diet:  You may advance to your regular diet as tolerated. Increase your clear liquid intake for the next 2-3 days. Nutrition Recommendations for Discharge:    Continue Oral Nutrition Supplements at discharge:   Ensure Enlive or Ensure Plus 1-2 times per day  for 30 days unless otherwise directed by your Primary Care Physician. This product can be purchased at your local grocery store or drug store and online. Erik Roberts, RD   _          Medication:      1. You will be given prescriptions for pain medication when you are discharged from the hospital. The side effects of these medications can be substantial and the narcotic medications are not mandatory. You may substitute these medications with Tylenol or Alleve / Motrin. 2.  Please use the medications as prescribed. Pain medications may cause constipation- Colace twice daily and Miralax two scoops 2-3 times a day while taking the narcotic medication should help prevent constipation. Other possible side effects of pain medication are dizziness, headache, nausea, vomiting, and urinary retention.   Discontinue the pain medication if you develop itching, rash, shortness of breath, or difficulties swallowing. If these symptoms become severe or are not relieved by discontinuing the medication, you should seek immediate medical attention. 3. Refills of pain medication are authorized during office hours only (8 AM- 5 PM  Monday thru Friday). Many of these medication will require you or a family member to pick-up a physical prescription at the office. 4. Medications other than antiinflammatories will not be called into the pharmacy after business hours. 5. Do not take Tylenol/Acetaminophen in addition to your pain medication as most pain medications already contain this ingredient. Do not exceed 4000mg of Tylenol/Acetaminophen per day. 6. You may resume the medication(s) you were taking prior to your surgery. Narcotics may change the effects of some antidepressant medication(s). If you have any questions about possible interactions between your regular medications and the pain medication, you should ask the pharmacist or contact the prescribing physician. 7. You will be discharged with prescriptions for additional pain medications (Tramadol or Toradol) and a medication for nausea and vomiting (Phenergan). You only need to fill these prescriptions if the primary pain medication is not working or you experiencing post-op nausea. 8. If you have constipation which is not improved by oral stool softeners then a Ducolax suppository should be purchased over the counter. 9. Continue the blood thinner (Aspirin or Lovenox) for a total of 30 days following surgery. Follow up appointment:    Please call our office at [phone] for your follow up appointment. This should be scheduled 14 days following the date of surgery. Physical Therapy / Nursing:    Physical Therapy following surgery will be arranged at home along with at home nursing care. They have specific instructions for rehab and wound care.  .     Returning to work:    Normal return to work is 3-12 weeks following total hip replacement. Depending on your progression following surgery and specific job duties you may take longer for a full return to work. DRIVING    You should not return to driving until you are off all narcotic pain medications and able to safely and quickly apply the brakes. This is normally 3-6 weeks for left hips and 4-8 weeks for right hip. Important Signs and Symptoms:    If any of the following signs or symptoms occur, you should contact Dr. Jose Archibald office. Please be advised if a problem arises which you feel requires immediate medical attention or you are unable to contact Dr. Jose Archibald office you should seek immediate medical attention at the ER or other health care facility you have access to.    1. A sudden increase in swelling and/or redness or warmth at the area your surgery was performed which isnt relieved by rest, ice, and elevation. 2. Oral temperature greater than 101 degrees for 12 hours or more which isnt relieved by an increase in fluid intake and taking 2 Tylenol every 4-6 hours. 3. Excessive drainage from your incisions, or drainage which hasnt stopped by 72 hours after your surgery. 4. Fever, chills, shortness of breath, chest pain, nausea, vomiting or other signs and symptoms which are of concern to you. frequently asked questions   What should I take for pain?  o In general you will be discharged with three medications for pain (Percocet 7.5 / 325mg, Oxycodone 5mg and Tramadol). This may vary slightly depending on what you were taking in the hospital.   - 1st Line - Percocet 1-2 tablets every 4-6 hrs (Or as directed).  This is the first and only medication you need to take. Initially you may need 2 tablets every 4 hours, but as your pain subsides, this will taper to 1 tablet every 6 hours. - 2nd Line - Oxycodone 1 every 8 hours (Or as directed), take these between Percocet doses if your pain is not below 4 / 10.   This may be needed only for several days following your discharge.  Oxycodone may be taken more frequently if needed 1-2 tablets every 4-6 hours if the pain is severe between Percocet doses. - 3rd Line - Tramadol - Take this medication as directed if the Percocet and Oxycodone are not working. Once you taper off Percocet, this medication may also be used. - 4th Line - Add Alleve 220mg every 12 hours or Motrin 400mg (200mg x 2) every 8 hours   When should I call for advice regarding my pain?  o After 12 hours on the above regimen, if nothing is working call the office [phone] or [phone]) or call my cell after hours [phone].  Can I get refills?  o Narcotic refills are provided for the first 6 weeks following surgery. - I will generally try to taper down to a single narcotic medication by your two week appointment. o Try Tylenol 650mg along with Alleve 220mg or Motrin 200mg during the majority of the day. - Save the narcotic pain medications for physical therapy (1 hour prior) and before sleeping at night. - Keep in mind you need to discontinue these medications prior to returning to driving.  Is swelling normal?  o Almost everyone has some degree of swelling following surgery. o Following hip and knee replacement surgery, swelling can be normal below the incision for the first 1-2 weeks. - This swelling peaks around 5-7 days after surgery.   - You may have some bruising around the back of the thigh, calf and even into the foot.  What should I do for the swelling?  o Keep the limb elevated. o Apply compression socks (knee high for total knees and up to the mid-thigh for total hips.   o Heat or ice may be applied, choose the modality that makes you the most comfortable.  How long should I remain on blood thinners following surgery?  o Thirty days   When can I drive?  o Once you have stopped using regular narcotic pain medications (Percocet, Lortab, etc.) and can safely apply the brakes without hesitation.     When can I shower? o 72hours following surgery if the incision is dry.  o No submersion of the incision, bathing or swimming for 14 days following surgery or until cleared by Dr Manish Rivera.  What do I do with the dressing when I shower?  o The dressing can be removed. o The incision is sealed with Dermabond (Biologic glue) and except for wounds which are draining should be watertight.  How active should I be following surgery?   o Progress activities in moderation at your own pace.   o Walk each day and set progressive goals with small increments (1st week - ½ block of walking, 2nd week - 1 block, 3rd week - 2 blocks, etc.)    Please do not hesitate to call me at [phone] (cell phone) for questions following surgery - Kaylen Cook MD

## 2017-08-04 NOTE — PROGRESS NOTES
Orthopaedic Progress Note  Post Op day: 2 Days Post-Op    2017 9:09 AM     Patient: Kevin Prabhakar MRN: [id number]  SSN: [id number]    YOB: 1935  Age: 80 y.o. Sex: male      Admit date:  2017  Date of Surgery:  2017   Procedures:  Procedure(s):  LEFT TOTAL HIP ARTHROPLASTY DIRECT ANTERIOR  Admitting Physician:  Genaro Oliveros MD   Surgeon:  Albaro Herrera) and Role:     * Genaro Oliveros MD - Primary    Consulting Physician(s): Treatment Team: Attending Provider: Genaro Oliveros MD; Care Manager: Tahira Vidal; Utilization Review: Cathe Closs, RN    SUBJECTIVE:     Kevin Prabhakar is a 80 y.o. male is 2 Days Post-Op s/p Procedure(s):  LEFT TOTAL HIP ARTHROPLASTY DIRECT ANTERIOR. Patient is sitting up in the chair this morning. Has already had breakfast. He denies hip pain. No complaints of nausea, vomiting, dizziness, lightheadedness, chest pain, or shortness of breath. Patient does report that his griffin catheter was tugged on after becoming twisted overnight. He has some residual penile pain from this event. OBJECTIVE:       Physical Exam:  General: Alert, cooperative, no distress. Respiratory: Respirations unlabored  Neurological:  Neurovascular exam within normal limits. Motor: + DF/PF. Dressing/Wound:  Clean, dry and intact. No significant erythema or swelling. : Griffin catheter in place. Working well, draining clear yellow urine.        Vital Signs:      Patient Vitals for the past 8 hrs:   BP Temp Pulse Resp SpO2   17 0905 173/76 - 83 - -   17 0728 173/76 98.3 °F (36.8 °C) 83 18 98 %   17 0417 161/77 98.7 °F (37.1 °C) 94 17 98 %                                          Temp (24hrs), Av.4 °F (36.9 °C), Min:97.6 °F (36.4 °C), Max:98.8 °F (37.1 °C)      Labs:        Recent Labs      17   0133   HGB  9.9*     Lab Results   Component Value Date/Time    Sodium 137 2017 01:14 AM    Potassium 4.2 2017 01:14 AM    Chloride 104 08/03/2017 01:14 AM    CO2 29 08/03/2017 01:14 AM    Glucose 143 08/03/2017 01:14 AM    BUN 18 08/03/2017 01:14 AM    Creatinine 1.09 08/03/2017 01:14 AM    Calcium 9.2 08/03/2017 01:14 AM       PT/OT:        Gait  Step Length: Left shortened, Right shortened  Gait Abnormalities: Step to gait, Path deviations  Ambulation - Level of Assistance: Minimal assistance, Moderate assistance  Distance (ft): 40 Feet (ft)  Assistive Device: Gait belt, Walker, rolling       Patient mobility  Bed Mobility Training  Supine to Sit: Additional time, Contact guard assistance  Scooting: Supervision  Transfer Training  Sit to Stand: Minimum assistance  Stand to Sit: Minimum assistance  Bed to Chair: Minimum assistance      Gait Training  Assistive Device: Gait belt, Walker, rolling  Ambulation - Level of Assistance: Minimal assistance, Moderate assistance  Distance (ft): 40 Feet (ft)   Weight Bearing Status  Left Side Weight Bearing: As tolerated        ASSESSMENT / PLAN:   Active Problems:    Hip arthritis (8/2/2017)         S/P LEFT TOTAL HIP ARTHROPLASTY DIRECT ANTERIOR -Continue working with PT/OT. -Ross catheter to be removed. Hx of BPH. Will bladder scan patient after first void to make sure he is not retaining urine prior to discharge. DVT Prophylaxis ASA BID, SCDs, patient to go home with TEDs   Pain Continue current regimen   Discharge Disposition Home w/HHPT when cleared by therapy. Planning on today.      Signed By: Oly Lua, SUSANNA    RN, MSN, FNP-BC  Orthopedic Nurse Practitioner  Marilia Mcmanus

## 2017-08-04 NOTE — PROGRESS NOTES
8/4/2017 3:40 PM [facility] has been sent discharge clinicals and notified of pt's discharge home today via [employer].  LORI SpencerW

## 2017-08-05 NOTE — PROGRESS NOTES
Spiritual Care Partner Volunteer visited patient in 436 on 8.3.17.   Documented by:    Tiesha Lambert M.Div, M.S, Patricia 424 available at [phone])

## 2017-08-16 LAB
BACTERIA SPEC CULT: NORMAL
GRAM STN SPEC: NORMAL
SERVICE CMNT-IMP: NORMAL

## 2018-01-31 ENCOUNTER — OFFICE VISIT (OUTPATIENT)
Dept: CARDIOLOGY CLINIC | Age: 83
End: 2018-01-31

## 2018-01-31 VITALS
DIASTOLIC BLOOD PRESSURE: 80 MMHG | SYSTOLIC BLOOD PRESSURE: 160 MMHG | BODY MASS INDEX: 24.49 KG/M2 | WEIGHT: 147 LBS | OXYGEN SATURATION: 98 % | HEART RATE: 78 BPM | HEIGHT: 65 IN | RESPIRATION RATE: 16 BRPM

## 2018-01-31 DIAGNOSIS — R42 DIZZINESS: ICD-10-CM

## 2018-01-31 DIAGNOSIS — I10 ESSENTIAL HYPERTENSION: Primary | ICD-10-CM

## 2018-01-31 DIAGNOSIS — R06.02 SOB (SHORTNESS OF BREATH): ICD-10-CM

## 2018-01-31 RX ORDER — AMLODIPINE BESYLATE 10 MG/1
10 TABLET ORAL DAILY
Qty: 30 TAB | Refills: 5 | Status: SHIPPED | OUTPATIENT
Start: 2018-01-31 | End: 2018-08-10 | Stop reason: SDUPTHER

## 2018-01-31 NOTE — PROGRESS NOTES
LAST OFFICE VISIT : 5/24/2017        ICD-10-CM ICD-9-CM   1. Essential hypertension I10 401.9   2. Dizziness R42 780.4   3. SOB (shortness of breath) R06.02 786.05       Bewilmar Shock is a 80 y.o. male with HTN referred for 6 month follow up evaluation.     Cardiac risk factors: sedentary life style, male gender, hypertension  I have personally obtained the history from the patient. HISTORY OF PRESENTING ILLNESS      Mr. Dahiana Segura feels well overall with no interval issues. He states BP at home is typically good, occasionally elevated. The patient denies chest pain/ shortness of breath, orthopnea, PND, LE edema, palpitations, syncope, presyncope or fatigue. He is here with his wife today.       ACTIVE PROBLEM LIST     Patient Active Problem List    Diagnosis Date Noted    Hip arthritis 08/02/2017    HTN (hypertension) 11/23/2016    Dizziness 08/09/2016           PAST MEDICAL HISTORY     Past Medical History:   Diagnosis Date    Arthritis     rheumatoid    Autoimmune disease ([address]     sjogren's syndrome, rheumatoid arhtritis    BPH (benign prostatic hyperplasia)     Cancer ([address]     skin cancer on ear right    Elevated cholesterol     Essential hypertension     Fatigue     Hyperlipidemia     Hypertension     Ill-defined condition     avascular necrosis left hip    Kidney stone     RA (rheumatoid arthritis) ([address]            PAST SURGICAL HISTORY     Past Surgical History:   Procedure Laterality Date    HX HEENT      bliat cataract surgery    HX ORTHOPAEDIC      right rotator cuff repair          ALLERGIES     Allergies   Allergen Reactions    Other Food Not Reported This Time     Does not eat deer meat or other sausages    Beef Containing Products Anaphylaxis     Reaction after tick bite    Pork Derived (Porcine) Anaphylaxis     Reaction after tick bite    Morphine Rash    Morphine Other (comments)     unknown          FAMILY HISTORY     Family History   Problem Relation Age of Onset    Diabetes Mother     Arthritis-osteo Father     Diabetes Sister     No Known Problems Brother     negative for cardiac disease       SOCIAL HISTORY     Social History     Social History    Marital status:      Spouse name: N/A    Number of children: N/A    Years of education: N/A     Social History Main Topics    Smoking status: Former Smoker     Quit date: 8/1/1970    Smokeless tobacco: Former User     Quit date: 9/1/1980      Comment: smoked cigars      Alcohol use No    Drug use: No    Sexual activity: Not Asked     Other Topics Concern    None     Social History Narrative    ** Merged History Encounter **              MEDICATIONS     Current Outpatient Prescriptions   Medication Sig    amLODIPine (NORVASC) 10 mg tablet Take 1 Tab by mouth daily.  aspirin (ASPIRIN) 325 mg tablet Take 1 Tab by mouth two (2) times a day.  ondansetron (ZOFRAN ODT) 8 mg disintegrating tablet Take 0.5 Tabs by mouth every eight (8) hours as needed for Nausea.  bisacodyl (DULCOLAX, BISACODYL,) 10 mg suppository Insert 10 mg into rectum daily.  amLODIPine (NORVASC) 5 mg tablet Take 5 mg by mouth.  tamsulosin (FLOMAX) 0.4 mg capsule Take 0.4 mg by mouth nightly.  hydroCHLOROthiazide (HYDRODIURIL) 25 mg tablet Take 25 mg by mouth daily.  ascorbic acid, vitamin C, (VITAMIN C) 1,000 mg tablet Take 1,000 mg by mouth daily.  OMEGA-3 FATTY ACIDS (FISH OIL CONCENTRATE PO) Take 600 mg by mouth daily.  vitamin E (AQUA GEMS) 400 unit capsule Take 400 Units by mouth daily.  CHOLECALCIFEROL, VITAMIN D3, (VITAMIN D3 PO) Take 1,000 Units by mouth daily.  atorvastatin (LIPITOR) 10 mg tablet Take  by mouth daily.  enalapril (VASOTEC) 20 mg tablet Take 20 mg by mouth daily.  GLUC JIM/CHONDRO JIM A/VIT C/MN (GLUCOSAMINE-CHONDROITIN MAX ST PO) Take 1,000 mg by mouth. 2 tab daily    aspirin delayed-release 81 mg tablet Take 325 mg by mouth daily.     multivitamin (ONE A DAY) tablet Take 1 Tab by mouth daily. No current facility-administered medications for this visit. I have reviewed the nurses notes, vitals, problem list, allergy list, medical history, family, social history and medications. REVIEW OF SYMPTOMS      General: Pt denies excessive weight gain or loss. Pt is able to conduct ADL's  HEENT: Denies blurred vision, headaches, hearing loss, epistaxis and difficulty swallowing. Respiratory: Denies cough, congestion, shortness of breath, GILMORE, wheezing or stridor. Cardiovascular: Denies precordial pain, palpitations, edema or PND  Gastrointestinal: Denies poor appetite, indigestion, abdominal pain or blood in stool  Genitourinary: Denies hematuria, dysuria, increased urinary frequency  Musculoskeletal: Denies joint pain or swelling from muscles or joints  Neurologic: Denies tremor, paresthesias, headache, or sensory motor disturbance  Psychiatric: Denies confusion, insomnia, depression  Integumentray: Denies rash, itching or ulcers. Hematologic: Denies easy bruising, bleeding     PHYSICAL EXAMINATION      Vitals:    01/31/18 1338   BP: 160/80   Pulse: 78   Resp: 16   SpO2: 98%   Weight: 147 lb (66.7 kg)   Height: 5' 5\" (1.651 m)     BP recheck: 150/78    General: Well developed, in no acute distress. HEENT: No jaundice, oral mucosa moist, no oral ulcers  Neck: Supple, no stiffness, no lymphadenopathy, supple  Heart:  Normal S1/S2 negative S3 or S4. Regular, no murmur, gallop or rub, no jugular venous distention  Respiratory: Clear bilaterally x 4, no wheezing or rales    Extremities:  No edema, normal cap refill, no cyanosis. Musculoskeletal: No clubbing, no deformities  Neuro: A&Ox3, speech clear, gait stable, cooperative, no focal neurologic deficits  Skin: Skin color is normal. No rashes or lesions. Non diaphoretic, moist.       DIAGNOSTIC DATA     1. Echo  11/3/16 - EF 55-60%    2. Lipids  10/19/16- , HDL 32, LDL 79,     3.  Lexiscan  11/3/16- No ischemia, EF 62%    4. LE Venous Doppler  8/2/17- no DVT           LABORATORY DATA            Lab Results   Component Value Date/Time    WBC 9.5 07/19/2017 02:49 PM    HGB 9.9 08/04/2017 01:33 AM    HCT 41.1 07/19/2017 02:49 PM    PLATELET 013 77/10/5282 02:49 PM    MCV 95.8 07/19/2017 02:49 PM      Lab Results   Component Value Date/Time    Sodium 137 08/03/2017 01:14 AM    Potassium 4.2 08/03/2017 01:14 AM    Chloride 104 08/03/2017 01:14 AM    CO2 29 08/03/2017 01:14 AM    Anion gap 4 08/03/2017 01:14 AM    Glucose 143 08/03/2017 01:14 AM    BUN 18 08/03/2017 01:14 AM    Creatinine 1.09 08/03/2017 01:14 AM    BUN/Creatinine ratio 17 08/03/2017 01:14 AM    GFR est AA >60 08/03/2017 01:14 AM    GFR est non-AA >60 08/03/2017 01:14 AM    Calcium 9.2 08/03/2017 01:14 AM    Bilirubin, total 0.4 07/19/2017 02:49 PM    AST (SGOT) 20 07/19/2017 02:49 PM    Alk. phosphatase 117 07/19/2017 02:49 PM    Protein, total 8.1 07/19/2017 02:49 PM    Albumin 4.1 07/19/2017 02:49 PM    Globulin 4.0 07/19/2017 02:49 PM    A-G Ratio 1.0 07/19/2017 02:49 PM    ALT (SGPT) 21 07/19/2017 02:49 PM           ASSESSMENT/RECOMMENDATIONS:.      1. HTN  - BP elevated today. Will increase Norvasc from 5 mg to 10 mg daily. 2. SOB  -no complaints of SOB at this time. He is doing well and seems well compensated.    3. Dyslipidemia  -Lipids are at goal, but he definitely needs cholesterol checkedas this has not been done. Gave him lab slip to get cholesterol checked in the near future.     4. Return in 6 months or PRN. Orders Placed This Encounter    HEPATIC FUNCTION PANEL    LIPID PANEL    amLODIPine (NORVASC) 10 mg tablet     Sig: Take 1 Tab by mouth daily. Dispense:  30 Tab     Refill:  5        Follow-up Disposition:  Return in about 6 months (around 7/31/2018). I have discussed the diagnosis with  Lawrence Ramos and the intended plan as seen in the above orders. Questions were answered concerning future plans.   I have discussed medication side effects and warnings with the patient as well. Thank you,  Nell Pal MD for involving me in the care of  [address] [place] [address]. Please do not hesitate to contact me for further questions/concerns. Written by Eboni Posadas, dictated by Jessica Matamoros MD.    Cierra Judd MD, [address] [place] [address] Rd., [address]      [facility], [address] [place] [address] Drive      [phone] / [phone] Fax

## 2018-01-31 NOTE — MR AVS SNAPSHOT
[address] [facility] [address] 1007 [facility] 
[phone] Patient: Saranya Ibarra MRN: [id number] Visit Information Date & Time Provider Department Dept. Phone Encounter #  
 1/31/2018  2:00 PM Verner Cheese, MD [facility] Wilma Wilkinson [phone] [id number] Follow-up Instructions Return in about 6 months (around 7/31/2018). Follow-up and Disposition History Your Appointments 8/8/2018  1:40 PM  
ESTABLISHED PATIENT with Verner Cheese, MD  
[facility] ([facility] SCHEDULING) Appt Note: 6 mo fu  
 [address] [facility] [address] 1007 [facility]  
[address] [facility] [address] [address] [place] [address] Upcoming Health Maintenance Date Due DTaP/Tdap/Td series (1 - Tdap) 9/12/1956 ZOSTER VACCINE AGE 60> 7/12/1995 GLAUCOMA SCREENING Q2Y 9/12/2000 Pneumococcal 65+ Low/Medium Risk (1 of 2 - PCV13) 9/12/2000 MEDICARE YEARLY EXAM 9/12/2000 Influenza Age 5 to Adult 8/1/2017 Allergies as of 1/31/2018  Review Complete On: 1/31/2018 By: Verner Cheese, MD  
  
 Severity Noted Reaction Type Reactions Other Food  07/19/2017    Not Reported This Time Does not eat deer meat or other sausages Beef Containing Products High 07/19/2017    Anaphylaxis Reaction after tick bite Pork Derived (Porcine) High 07/19/2017    Anaphylaxis Reaction after tick bite Morphine  08/09/2016    Rash Morphine  07/19/2017    Other (comments)  
 unknown Current Immunizations  Never Reviewed No immunizations on file. Not reviewed this visit You Were Diagnosed With   
  
 Codes Comments Essential hypertension    -  Primary ICD-10-CM: I10 
ICD-9-CM: 401.9 Dizziness     ICD-10-CM: J56 ICD-9-CM: 780.4 SOB (shortness of breath)     ICD-10-CM: R06.02 
ICD-9-CM: 786.05 Vitals BP Pulse Resp Height(growth percentile) Weight(growth percentile) SpO2  
 160/80 (BP 1 Location: Left arm, BP Patient Position: Sitting) 78 16 5' 5\" (1.651 m) 147 lb (66.7 kg) 98% BMI Smoking Status 24.46 kg/m2 Former Smoker BMI and BSA Data Body Mass Index Body Surface Area  
 24.46 kg/m 2 1.75 m 2 Preferred Pharmacy Pharmacy Name Phone [facility] [address] [place], [place] [id number] [address] [phone] Your Updated Medication List  
  
   
This list is accurate as of: 1/31/18  3:13 PM.  Always use your most recent med list.  
  
  
  
  
 * amLODIPine 5 mg tablet Commonly known as:  Jerri Prow Take 5 mg by mouth. * amLODIPine 10 mg tablet Commonly known as:  Jerri Prow Take 1 Tab by mouth daily. * aspirin delayed-release 81 mg tablet Take 325 mg by mouth daily. * aspirin 325 mg tablet Commonly known as:  ASPIRIN Take 1 Tab by mouth two (2) times a day. atorvastatin 10 mg tablet Commonly known as:  LIPITOR Take  by mouth daily. bisacodyl 10 mg suppository Commonly known as:  DULCOLAX (BISACODYL) Insert 10 mg into rectum daily. enalapril 20 mg tablet Commonly known as:  Ana Mattock Take 20 mg by mouth daily. FISH OIL CONCENTRATE PO Take 600 mg by mouth daily. GLUCOSAMINE-CHONDROITIN MAX ST PO Take 1,000 mg by mouth. 2 tab daily  
  
 hydroCHLOROthiazide 25 mg tablet Commonly known as:  HYDRODIURIL Take 25 mg by mouth daily. multivitamin tablet Commonly known as:  ONE A DAY Take 1 Tab by mouth daily. ondansetron 8 mg disintegrating tablet Commonly known as:  ZOFRAN ODT Take 0.5 Tabs by mouth every eight (8) hours as needed for Nausea. tamsulosin 0.4 mg capsule Commonly known as:  FLOMAX Take 0.4 mg by mouth nightly. VITAMIN C 1,000 mg tablet Generic drug:  ascorbic acid (vitamin C) Take 1,000 mg by mouth daily.   
  
 VITAMIN D3 PO  
 Take 1,000 Units by mouth daily. vitamin E 400 unit capsule Commonly known as:  [address] Take 400 Units by mouth daily. * Notice: This list has 4 medication(s) that are the same as other medications prescribed for you. Read the directions carefully, and ask your doctor or other care provider to review them with you. Prescriptions Sent to Pharmacy Refills  
 amLODIPine (NORVASC) 10 mg tablet 5 Sig: Take 1 Tab by mouth daily. Class: Normal  
 Pharmacy: [address] [place] [address], [address] [place] [address] #: [phone] Route: Oral  
  
We Performed the Following HEPATIC FUNCTION PANEL [15327 CPT(R)] LIPID PANEL [62188 CPT(R)] Follow-up Instructions Return in about 6 months (around 7/31/2018). Patient Instructions Increase Norvasc to 10 mg daily. Get fasting labs in the near future. Patient Instructions History Introducing [facility]! Yahaira Winters introduces [employer] patient portal. Now you can access parts of your medical record, email your doctor's office, and request medication refills online. 1. In your internet browser, go to [email or web address] 2. Click on the First Time User? Click Here link in the Sign In box. You will see the New Member Sign Up page. 3. Enter your [employer] Access Code exactly as it appears below. You will not need to use this code after youve completed the sign-up process. If you do not sign up before the expiration date, you must request a new code. · [employer] Access Code: [email or web address] Expires: 5/1/2018  3:13 PM 
 
4. Enter the last four digits of your Social Security Number (xxxx) and Date of Birth (mm/dd/yyyy) as indicated and click Submit. You will be taken to the next sign-up page. 5. Create a Amplimmune ID. This will be your [employer] login ID and cannot be changed, so think of one that is secure and easy to remember. 6. Create a PayPlug password. You can change your password at any time. 7. Enter your Password Reset Question and Answer. This can be used at a later time if you forget your password. 8. Enter your e-mail address. You will receive e-mail notification when new information is available in [address] 9. Click Sign Up. You can now view and download portions of your medical record. 10. Click the Download Summary menu link to download a portable copy of your medical information. If you have questions, please visit the Frequently Asked Questions section of the PayPlug website. Remember, PayPlug is NOT to be used for urgent needs. For medical emergencies, dial 911. Now available from your iPhone and Android! Please provide this summary of care documentation to your next provider. Your primary care clinician is listed as Jacinta Henderson. If you have any questions after today's visit, please call [phone].

## 2018-01-31 NOTE — PROGRESS NOTES
Visit Vitals    /80 (BP 1 Location: Left arm, BP Patient Position: Sitting)    Pulse 78    Resp 16    Ht 5' 5\" (1.651 m)    Wt 147 lb (66.7 kg)    SpO2 98%    BMI 24.46 kg/m2     Medication changes for this OV VO Dr Shelle Gaucher

## 2018-02-06 ENCOUNTER — HOSPITAL ENCOUNTER (OUTPATIENT)
Dept: LAB | Age: 83
Discharge: HOME | End: 2018-02-06
Payer: MEDICARE

## 2018-02-06 PROCEDURE — 80061 LIPID PANEL: CPT

## 2018-02-06 PROCEDURE — 80076 HEPATIC FUNCTION PANEL: CPT

## 2018-02-06 PROCEDURE — 36415 COLL VENOUS BLD VENIPUNCTURE: CPT

## 2018-02-07 LAB
ALBUMIN SERPL-MCNC: 4.2 G/DL (ref 3.5–4.7)
ALP SERPL-CCNC: 92 IU/L (ref 39–117)
ALT SERPL-CCNC: 10 IU/L (ref 0–44)
AST SERPL-CCNC: 17 IU/L (ref 0–40)
BILIRUB DIRECT SERPL-MCNC: 0.12 MG/DL (ref 0–0.4)
BILIRUB SERPL-MCNC: 0.5 MG/DL (ref 0–1.2)
CHOLEST SERPL-MCNC: 136 MG/DL (ref 100–199)
HDLC SERPL-MCNC: 31 MG/DL
INTERPRETATION, 910389: NORMAL
LDLC SERPL CALC-MCNC: 80 MG/DL (ref 0–99)
PROT SERPL-MCNC: 7.1 G/DL (ref 6–8.5)
TRIGL SERPL-MCNC: 127 MG/DL (ref 0–149)
VLDLC SERPL CALC-MCNC: 25 MG/DL (ref 5–40)

## 2018-03-19 DIAGNOSIS — E78.00 HYPERCHOLESTEREMIA: Primary | ICD-10-CM

## 2018-08-08 ENCOUNTER — OFFICE VISIT (OUTPATIENT)
Dept: CARDIOLOGY CLINIC | Age: 83
End: 2018-08-08

## 2018-08-08 VITALS
RESPIRATION RATE: 16 BRPM | HEART RATE: 66 BPM | SYSTOLIC BLOOD PRESSURE: 120 MMHG | DIASTOLIC BLOOD PRESSURE: 74 MMHG | BODY MASS INDEX: 23.93 KG/M2 | HEIGHT: 65 IN | WEIGHT: 143.6 LBS | OXYGEN SATURATION: 98 %

## 2018-08-08 DIAGNOSIS — I10 ESSENTIAL HYPERTENSION: Primary | ICD-10-CM

## 2018-08-08 DIAGNOSIS — R06.02 SOB (SHORTNESS OF BREATH): ICD-10-CM

## 2018-08-08 RX ORDER — HYDROXYCHLOROQUINE SULFATE 200 MG/1
200 TABLET, FILM COATED ORAL DAILY
COMMUNITY

## 2018-08-08 RX ORDER — DULOXETIN HYDROCHLORIDE 30 MG/1
30 CAPSULE, DELAYED RELEASE ORAL DAILY
COMMUNITY
End: 2019-02-06

## 2018-08-08 RX ORDER — ENALAPRIL MALEATE 20 MG/1
TABLET ORAL
COMMUNITY
Start: 2017-05-22 | End: 2020-06-01 | Stop reason: SDUPTHER

## 2018-08-08 NOTE — MR AVS SNAPSHOT
[address] [facility] 600 1007 [facility] 
[phone] Patient: Brandie Valdez MRN: [id number] KFI:[id number]/[id number] Visit Information Date & Time Provider Department Dept. Phone Encounter #  
 8/8/2018  1:40 PM Marichuy Mccormack MD [facility] Obi Skinner [phone] [id number] Follow-up Instructions Return in about 6 months (around 2/8/2019). Your Appointments 2/6/2019  2:20 PM  
ESTABLISHED PATIENT with Marichuy Mccormack MD  
[facility] ([facility] SCHEDULING) Appt Note: 6 mo fup  
 [address] [facility] 600 1007 [facility]  
[address] [facility] [address] [address] Upcoming Health Maintenance Date Due DTaP/Tdap/Td series (1 - Tdap) 9/12/1956 ZOSTER VACCINE AGE 60> 7/12/1995 GLAUCOMA SCREENING Q2Y 9/12/2000 Pneumococcal 65+ Low/Medium Risk (1 of 2 - PCV13) 9/12/2000 MEDICARE YEARLY EXAM 3/14/2018 Influenza Age 5 to Adult 8/1/2018 Allergies as of 8/8/2018  Review Complete On: 8/8/2018 By: Marichuy Mccormack MD  
  
 Severity Noted Reaction Type Reactions Other Food  07/19/2017    Not Reported This Time Does not eat deer meat or other sausages Beef Containing Products High 07/19/2017    Anaphylaxis Reaction after tick bite Pork Derived (Porcine) High 07/19/2017    Anaphylaxis Reaction after tick bite Morphine  08/09/2016    Rash Morphine  07/19/2017    Other (comments)  
 unknown Current Immunizations  Never Reviewed No immunizations on file. Not reviewed this visit You Were Diagnosed With   
  
 Codes Comments Essential hypertension    -  Primary ICD-10-CM: I10 
ICD-9-CM: 401.9 SOB (shortness of breath)     ICD-10-CM: R06.02 
ICD-9-CM: 786.05 Vitals BP Pulse Resp Height(growth percentile) Weight(growth percentile) SpO2 120/74 (BP 1 Location: Left arm, BP Patient Position: Sitting) 66 16 5' 5\" (1.651 m) 143 lb 9.6 oz (65.1 kg) 98% BMI Smoking Status 23.9 kg/m2 Former Smoker Vitals History BMI and BSA Data Body Mass Index Body Surface Area  
 23.9 kg/m 2 1.73 m 2 Preferred Pharmacy Pharmacy Name Phone Prash[facility] Campbell [address] [place] [address] [address] [facility] [address] [phone] Your Updated Medication List  
  
   
This list is accurate as of 8/8/18  2:06 PM.  Always use your most recent med list.  
  
  
  
  
 * amLODIPine 5 mg tablet Commonly known as:  Carlos Alstrom Take 5 mg by mouth. * amLODIPine 10 mg tablet Commonly known as:  Carlos Alstrom Take 1 Tab by mouth daily. * aspirin delayed-release 81 mg tablet Take 325 mg by mouth daily. * aspirin 325 mg tablet Commonly known as:  ASPIRIN Take 1 Tab by mouth two (2) times a day. atorvastatin 10 mg tablet Commonly known as:  LIPITOR Take  by mouth daily. bisacodyl 10 mg suppository Commonly known as:  DULCOLAX (BISACODYL) Insert 10 mg into rectum daily. CYMBALTA 30 mg capsule Generic drug:  DULoxetine Take 30 mg by mouth daily. enalapril 20 mg tablet Commonly known as:  Elkhorn Ratel FISH OIL CONCENTRATE PO Take 600 mg by mouth daily. GLUCOSAMINE-CHONDROITIN MAX ST PO Take 1,000 mg by mouth. 2 tab daily  
  
 hydroCHLOROthiazide 25 mg tablet Commonly known as:  HYDRODIURIL Take 12.5 mg by mouth daily. multivitamin tablet Commonly known as:  ONE A DAY Take 1 Tab by mouth daily. ondansetron 8 mg disintegrating tablet Commonly known as:  ZOFRAN ODT Take 0.5 Tabs by mouth every eight (8) hours as needed for Nausea. PLAQUENIL 200 mg tablet Generic drug:  hydroxychloroquine Take 200 mg by mouth daily. tamsulosin 0.4 mg capsule Commonly known as:  FLOMAX Take 0.4 mg by mouth nightly. VITAMIN C 1,000 mg tablet Generic drug:  ascorbic acid (vitamin C) Take 1,000 mg by mouth daily. VITAMIN D3 PO Take 1,000 Units by mouth daily. vitamin E 400 unit capsule Commonly known as:  [address] Take 400 Units by mouth daily. * Notice: This list has 4 medication(s) that are the same as other medications prescribed for you. Read the directions carefully, and ask your doctor or other care provider to review them with you. We Performed the Following AMB POC EKG ROUTINE W/ 12 LEADS, INTER & REP [94318 CPT(R)] HEPATIC FUNCTION PANEL [60915 CPT(R)] LIPID PANEL [83845 CPT(R)] Follow-up Instructions Return in about 6 months (around 2/8/2019). Introducing [facility]! [employer] introduces [employer] patient portal. Now you can access parts of your medical record, email your doctor's office, and request medication refills online. 1. In your internet browser, go to [email or web address] 2. Click on the First Time User? Click Here link in the Sign In box. You will see the New Member Sign Up page. 3. Enter your [employer] Access Code exactly as it appears below. You will not need to use this code after youve completed the sign-up process. If you do not sign up before the expiration date, you must request a new code. · [employer] Access Code: [email or web address] Expires: 11/6/2018  1:45 PM 
 
4. Enter the last four digits of your Social Security Number (xxxx) and Date of Birth (mm/dd/yyyy) as indicated and click Submit. You will be taken to the next sign-up page. 5. Create a [employer] ID. This will be your [employer] login ID and cannot be changed, so think of one that is secure and easy to remember. 6. Create a [employer] password. You can change your password at any time. 7. Enter your Password Reset Question and Answer. This can be used at a later time if you forget your password. 8. Enter your e-mail address. You will receive e-mail notification when new information is available in [address] 9. Click Sign Up. You can now view and download portions of your medical record. 10. Click the Download Summary menu link to download a portable copy of your medical information. If you have questions, please visit the Frequently Asked Questions section of the Dialoggy website. Remember, Dialoggy is NOT to be used for urgent needs. For medical emergencies, dial 911. Now available from your iPhone and Android! Please provide this summary of care documentation to your next provider. Your primary care clinician is listed as Kenneth Ferrer. If you have any questions after today's visit, please call [phone].

## 2018-08-08 NOTE — PROGRESS NOTES
All health maintenance and other pertinent information has been reviewed in preparation for today's office visit. Patient presents in the office today for:    Chief Complaint   Patient presents with    Follow-up     SOB, CHOL, HTN     1. Have you been to the ER, urgent care clinic since your last visit? Hospitalized since your last visit? No    2. Have you seen or consulted any other health care providers outside of the [address] [place] [address] since your last visit? Include any pap smears or colon screening.  No    Visit Vitals    /74 (BP 1 Location: Left arm, BP Patient Position: Sitting)    Pulse 66    Resp 16    Ht 5' 5\" (1.651 m)    Wt 143 lb 9.6 oz (65.1 kg)    SpO2 98%    BMI 23.9 kg/m2

## 2018-08-08 NOTE — PROGRESS NOTES
LAST OFFICE VISIT : 1/31/2018        ICD-10-CM ICD-9-CM   1. Essential hypertension I10 401.9   2. SOB (shortness of breath) R06.02 786.05       Sammy Brandon is a 80 y.o. male with HTN referred for 6 month follow up evaluation.     Cardiac risk factors: sedentary life style, male gender, hypertension  I have personally obtained the history from the patient. HISTORY OF PRESENTING ILLNESS      Mr. Winnie Kennedy reports a 2-3 month history of increased, non-pitting lower extremity edema. He frequently sits with his feet propped up to reduce swelling. He no longer has shortness of breath. Per daughter, patient is not anemic. He denies chest pain/ shortness of breath, orthopnea, PND, palpitations, syncope, presyncope or fatigue. He is here with his wife and daughter today.       ACTIVE PROBLEM LIST     Patient Active Problem List    Diagnosis Date Noted    Hip arthritis 08/02/2017    HTN (hypertension) 11/23/2016    Dizziness 08/09/2016           PAST MEDICAL HISTORY     Past Medical History:   Diagnosis Date    Arthritis     rheumatoid    Autoimmune disease ([address]     sjogren's syndrome, rheumatoid arhtritis    BPH (benign prostatic hyperplasia)     Cancer ([address]     skin cancer on ear right    Elevated cholesterol     Essential hypertension     Fatigue     Hyperlipidemia     Hypertension     Ill-defined condition     avascular necrosis left hip    Kidney stone     RA (rheumatoid arthritis) ([address]            PAST SURGICAL HISTORY     Past Surgical History:   Procedure Laterality Date    HX HEENT      bliat cataract surgery    HX ORTHOPAEDIC      right rotator cuff repair          ALLERGIES     Allergies   Allergen Reactions    Other Food Not Reported This Time     Does not eat deer meat or other sausages    Beef Containing Products Anaphylaxis     Reaction after tick bite    Pork Derived (Porcine) Anaphylaxis     Reaction after tick bite    Morphine Rash    Morphine Other (comments) unknown          FAMILY HISTORY     Family History   Problem Relation Age of Onset    Diabetes Mother    Jayashree Luna Arthritis-osteo Father     Diabetes Sister     No Known Problems Brother     negative for cardiac disease       SOCIAL HISTORY     Social History     Social History    Marital status:      Spouse name: N/A    Number of children: N/A    Years of education: N/A     Social History Main Topics    Smoking status: Former Smoker     Quit date: 8/1/1970    Smokeless tobacco: Former User     Quit date: 9/1/1980      Comment: smoked cigars      Alcohol use No    Drug use: No    Sexual activity: Not Asked     Other Topics Concern    None     Social History Narrative    ** Merged History Encounter **              MEDICATIONS     Current Outpatient Prescriptions   Medication Sig    hydroxychloroquine (PLAQUENIL) 200 mg tablet Take 200 mg by mouth daily.  enalapril (VASOTEC) 20 mg tablet     DULoxetine (CYMBALTA) 30 mg capsule Take 30 mg by mouth daily.  amLODIPine (NORVASC) 10 mg tablet Take 1 Tab by mouth daily.  tamsulosin (FLOMAX) 0.4 mg capsule Take 0.4 mg by mouth nightly.  hydroCHLOROthiazide (HYDRODIURIL) 25 mg tablet Take 12.5 mg by mouth daily.  CHOLECALCIFEROL, VITAMIN D3, (VITAMIN D3 PO) Take 1,000 Units by mouth daily.  atorvastatin (LIPITOR) 10 mg tablet Take  by mouth daily.  aspirin delayed-release 81 mg tablet Take 325 mg by mouth daily.  aspirin (ASPIRIN) 325 mg tablet Take 1 Tab by mouth two (2) times a day. (Patient taking differently: Take 81 mg by mouth two (2) times a day.)    ondansetron (ZOFRAN ODT) 8 mg disintegrating tablet Take 0.5 Tabs by mouth every eight (8) hours as needed for Nausea.  bisacodyl (DULCOLAX, BISACODYL,) 10 mg suppository Insert 10 mg into rectum daily.  amLODIPine (NORVASC) 5 mg tablet Take 5 mg by mouth.  ascorbic acid, vitamin C, (VITAMIN C) 1,000 mg tablet Take 1,000 mg by mouth daily.     OMEGA-3 FATTY ACIDS (FISH OIL CONCENTRATE PO) Take 600 mg by mouth daily.  vitamin E (AQUA GEMS) 400 unit capsule Take 400 Units by mouth daily.  GLUC JIM/CHONDRO JIM A/VIT C/MN (GLUCOSAMINE-CHONDROITIN MAX ST PO) Take 1,000 mg by mouth. 2 tab daily    multivitamin (ONE A DAY) tablet Take 1 Tab by mouth daily. No current facility-administered medications for this visit. I have reviewed the nurses notes, vitals, problem list, allergy list, medical history, family, social history and medications. REVIEW OF SYMPTOMS      General: Pt denies excessive weight gain or loss. Pt is able to conduct ADL's  HEENT: Denies blurred vision, headaches, hearing loss, epistaxis and difficulty swallowing. Respiratory: Denies cough, congestion, shortness of breath, GILMORE, wheezing or stridor. Cardiovascular: Denies precordial pain, palpitations, edema or PND  Gastrointestinal: Denies poor appetite, indigestion, abdominal pain or blood in stool  Genitourinary: Denies hematuria, dysuria, increased urinary frequency  Musculoskeletal: Denies joint pain or swelling from muscles or joints  Neurologic: Denies tremor, paresthesias, headache, or sensory motor disturbance  Psychiatric: Denies confusion, insomnia, depression  Integumentray: Denies rash, itching or ulcers. Hematologic: Denies easy bruising, bleeding     PHYSICAL EXAMINATION      Vitals:    08/08/18 1336   Weight: 143 lb 9.6 oz (65.1 kg)   Height: 5' 5\" (1.651 m)     General: Well developed, in no acute distress. HEENT: No jaundice, oral mucosa moist, no oral ulcers  Neck: Supple, no stiffness, no lymphadenopathy, supple  Heart:  regular no gallop or rub, no jugular venous distention  Respiratory: Clear bilaterally x 4, no wheezing or rales    Extremities:  No edema, normal cap refill, no cyanosis. Musculoskeletal: No clubbing, no deformities  Neuro: A&Ox3, speech clear, gait stable, cooperative, no focal neurologic deficits  Skin: Skin color is normal. No rashes or lesions.  Non diaphoretic, moist.       DIAGNOSTIC DATA     1. Echo  11/3/16 - EF 55-60%    2. Lipids  10/19/16- , HDL 32, LDL 79,   2/6/18- , HDL 31, LDL 80,     3. Lexiscan  11/3/16- No ischemia, EF 62%    4. LE Venous Doppler  8/2/17- no DVT      EKG 8/8/18 - SR, Normal EKG     LABORATORY DATA            Lab Results   Component Value Date/Time    WBC 9.5 07/19/2017 02:49 PM    HGB 9.9 (L) 08/04/2017 01:33 AM    HCT 41.1 07/19/2017 02:49 PM    PLATELET 398 99/88/2058 02:49 PM    MCV 95.8 07/19/2017 02:49 PM      Lab Results   Component Value Date/Time    Sodium 137 08/03/2017 01:14 AM    Potassium 4.2 08/03/2017 01:14 AM    Chloride 104 08/03/2017 01:14 AM    CO2 29 08/03/2017 01:14 AM    Anion gap 4 (L) 08/03/2017 01:14 AM    Glucose 143 (H) 08/03/2017 01:14 AM    BUN 18 08/03/2017 01:14 AM    Creatinine 1.09 08/03/2017 01:14 AM    BUN/Creatinine ratio 17 08/03/2017 01:14 AM    GFR est AA >60 08/03/2017 01:14 AM    GFR est non-AA >60 08/03/2017 01:14 AM    Calcium 9.2 08/03/2017 01:14 AM    Bilirubin, total 0.5 02/06/2018 09:37 AM    AST (SGOT) 17 02/06/2018 09:37 AM    Alk. phosphatase 92 02/06/2018 09:37 AM    Protein, total 7.1 02/06/2018 09:37 AM    Albumin 4.2 02/06/2018 09:37 AM    Globulin 4.0 07/19/2017 02:49 PM    A-G Ratio 1.0 (L) 07/19/2017 02:49 PM    ALT (SGPT) 10 02/06/2018 09:37 AM           ASSESSMENT/RECOMMENDATIONS:.      1. HTN  - BP is under good control today on current medical regiment. No adjustments today. 2. Lower Extremity Edema  - I recommended patient try knee-high compression stockings (20-30 mmHg)    3. Dyslipidemia  -Lipids are at goal. He will have this checked around 9/2018.      Return in 6 months or PRN. Orders Placed This Encounter    AMB POC EKG ROUTINE W/ 12 LEADS, INTER & REP     Order Specific Question:   Reason for Exam:     Answer:   HTN    hydroxychloroquine (PLAQUENIL) 200 mg tablet     Sig: Take 200 mg by mouth daily.     enalapril (VASOTEC) 20 mg tablet    DULoxetine (CYMBALTA) 30 mg capsule     Sig: Take 30 mg by mouth daily. Follow-up Disposition:  Return in about 6 months (around 2/8/2019). I have discussed the diagnosis with  Rajesh Ordonez and the intended plan as seen in the above orders. Questions were answered concerning future plans. I have discussed medication side effects and warnings with the patient as well. Thank you,  Josselin Child MD for involving me in the care of  Rajesh Ordonez. Please do not hesitate to contact me for further questions/concerns. Written by Jose Ron, dictated by Arik Feliciano MD.    David Judd MD, [address] [place] [address] Rd[address]      [facility], [address] [place] [address] [facility]      [phone] / [phone] Fax

## 2019-02-04 NOTE — PROGRESS NOTES
LAST OFFICE VISIT : 8/08/18      ICD-10-CM ICD-9-CM   1. Essential hypertension I10 401.9   2. SOB (shortness of breath) R06.02 786.05     Sadie Vasquez is a 80 y.o. male with HTN referred for 6 month follow up evaluation.     Cardiac risk factors: sedentary life style, male gender, hypertension  I have personally obtained the history from the patient. HISTORY OF PRESENTING ILLNESS      Mr. Tomasa Mclean reports constant pains in his bilateral legs. He is slow in speech and thought. He notes pain was so severe he \"wanted to kiss [himself]\". He is s/p a left THR, and has had injections in the past. He was recently started on Prednisone. His activity is limited by pain. He odell snot use a cane. He states his BP has been normal.     Patient denies any exertional chest pain, dyspnea, palpitations, syncope, orthopnea, edema or paroxysmal nocturnal dyspnea.      ACTIVE PROBLEM LIST     Patient Active Problem List    Diagnosis Date Noted    Hip arthritis 08/02/2017    HTN (hypertension) 11/23/2016    Dizziness 08/09/2016           PAST MEDICAL HISTORY     Past Medical History:   Diagnosis Date    Arthritis     rheumatoid    Autoimmune disease ([address]     sjogren's syndrome, rheumatoid arhtritis    BPH (benign prostatic hyperplasia)     Cancer ([address]     skin cancer on ear right    Elevated cholesterol     Essential hypertension     Fatigue     Hyperlipidemia     Hypertension     Ill-defined condition     avascular necrosis left hip    Kidney stone     RA (rheumatoid arthritis) ([address]            PAST SURGICAL HISTORY     Past Surgical History:   Procedure Laterality Date    HX HEENT      bliat cataract surgery    HX ORTHOPAEDIC      right rotator cuff repair          ALLERGIES     Allergies   Allergen Reactions    Other Food Not Reported This Time     Does not eat deer meat or other sausages    Beef Containing Products Anaphylaxis     Reaction after tick bite    Pork Derived (Porcine) Anaphylaxis Reaction after tick bite    Morphine Rash    Morphine Other (comments)     unknown          FAMILY HISTORY     Family History   Problem Relation Age of Onset    Diabetes Mother     Arthritis-osteo Father     Diabetes Sister     No Known Problems Brother     negative for cardiac disease       SOCIAL HISTORY     Social History     Socioeconomic History    Marital status:      Spouse name: Not on file    Number of children: Not on file    Years of education: Not on file    Highest education level: Not on file   Tobacco Use    Smoking status: Former Smoker     Last attempt to quit: 1970     Years since quittin.5    Smokeless tobacco: Former User     Quit date: 1980    Tobacco comment: smoked cigars     Substance and Sexual Activity    Alcohol use: No     Alcohol/week: 0.0 oz    Drug use: No   Social History Narrative    ** Merged History Encounter **              MEDICATIONS     Current Outpatient Medications   Medication Sig    aspirin delayed-release 81 mg tablet Take  by mouth daily.  chlorthalidone (HYGROTEN) 25 mg tablet Take  by mouth daily.  predniSONE (DELTASONE) 5 mg tablet Take  by mouth.  amLODIPine (NORVASC) 10 mg tablet TAKE 1 TABLET EVERY DAY    hydroxychloroquine (PLAQUENIL) 200 mg tablet Take 200 mg by mouth daily.  enalapril (VASOTEC) 20 mg tablet     tamsulosin (FLOMAX) 0.4 mg capsule Take 0.4 mg by mouth nightly.  hydroCHLOROthiazide (HYDRODIURIL) 25 mg tablet Take 12.5 mg by mouth daily.  CHOLECALCIFEROL, VITAMIN D3, (VITAMIN D3 PO) Take 1,000 Units by mouth daily.  atorvastatin (LIPITOR) 10 mg tablet Take  by mouth daily.  GLUC JIM/CHONDRO JIM A/VIT C/MN (GLUCOSAMINE-CHONDROITIN MAX ST PO) Take 1,000 mg by mouth. 2 tab daily    multivitamin (ONE A DAY) tablet Take 1 Tab by mouth daily.  ascorbic acid, vitamin C, (VITAMIN C) 1,000 mg tablet Take 1,000 mg by mouth daily. No current facility-administered medications for this visit. I have reviewed the nurses notes, vitals, problem list, allergy list, medical history, family, social history and medications. REVIEW OF SYMPTOMS      General: Pt denies excessive weight gain or loss. Pt is able to conduct ADL's  HEENT: Denies blurred vision, headaches, hearing loss, epistaxis and difficulty swallowing. Respiratory: Denies cough, congestion, shortness of breath, GILMORE, wheezing or stridor. Cardiovascular: Denies precordial pain, palpitations, edema or PND  Gastrointestinal: Denies poor appetite, indigestion, abdominal pain or blood in stool  Genitourinary: Denies hematuria, dysuria, increased urinary frequency  Musculoskeletal: Denies joint pain or swelling from muscles or joints  Neurologic: Denies tremor, paresthesias, headache, or sensory motor disturbance  Psychiatric: Denies confusion, insomnia, depression  Integumentray: Denies rash, itching or ulcers. Hematologic: Denies easy bruising, bleeding     PHYSICAL EXAMINATION      Vitals:    02/06/19 1324   BP: 130/58   Pulse: 76   Weight: 144 lb (65.3 kg)   Height: 5' 5\" (1.651 m)     General: Well developed, in no acute distress. HEENT: No jaundice, oral mucosa moist, no oral ulcers  Neck: Supple, no stiffness, no lymphadenopathy, supple  Heart:  regular no gallop or rub, no jugular venous distention  Respiratory: Clear bilaterally x 4, no wheezing or rales  Extremities:  Peripheral pressure is strong. No edema, normal cap refill, no cyanosis. Musculoskeletal: No clubbing, no deformities  Neuro: A&Ox3, speech clear, gait stable, cooperative, no focal neurologic deficits  Skin: Skin color is normal. No rashes or lesions. Non diaphoretic, moist.       DIAGNOSTIC DATA     1. Echo  11/3/16 - EF 55-60%    2. Lipids  10/19/16- , HDL 32, LDL 79,   2/6/18- , HDL 31, LDL 80,   5/7/18-, HDL 28, LDL 78, Tg 192    3. Lexiscan  11/3/16- No ischemia, EF 62%    4.  LE Venous Doppler  8/2/17- no DVT      EKG 8/8/18 - SR, Normal EKG     LABORATORY DATA            Lab Results   Component Value Date/Time    WBC 9.5 07/19/2017 02:49 PM    HGB 9.9 (L) 08/04/2017 01:33 AM    HCT 41.1 07/19/2017 02:49 PM    PLATELET 629 30/06/9067 02:49 PM    MCV 95.8 07/19/2017 02:49 PM      Lab Results   Component Value Date/Time    Sodium 137 08/03/2017 01:14 AM    Potassium 4.2 08/03/2017 01:14 AM    Chloride 104 08/03/2017 01:14 AM    CO2 29 08/03/2017 01:14 AM    Anion gap 4 (L) 08/03/2017 01:14 AM    Glucose 143 (H) 08/03/2017 01:14 AM    BUN 18 08/03/2017 01:14 AM    Creatinine 1.09 08/03/2017 01:14 AM    BUN/Creatinine ratio 17 08/03/2017 01:14 AM    GFR est AA >60 08/03/2017 01:14 AM    GFR est non-AA >60 08/03/2017 01:14 AM    Calcium 9.2 08/03/2017 01:14 AM    Bilirubin, total 0.5 02/06/2018 09:37 AM    AST (SGOT) 17 02/06/2018 09:37 AM    Alk. phosphatase 92 02/06/2018 09:37 AM    Protein, total 7.1 02/06/2018 09:37 AM    Albumin 4.2 02/06/2018 09:37 AM    Globulin 4.0 07/19/2017 02:49 PM    A-G Ratio 1.0 (L) 07/19/2017 02:49 PM    ALT (SGPT) 10 02/06/2018 09:37 AM       ASSESSMENT/RECOMMENDATIONS:.      1. HTN  - BP is under good control today on current medical regiment.   - No adjustments today. 2. Dyslipidemia  - Lipids are at goal.  - Followed by Yamile Anders MD     3. Leg pain  - no sxs of claudication  - distal pulses are strong     Return in 6 months or PRN. Orders Placed This Encounter    aspirin delayed-release 81 mg tablet     Sig: Take  by mouth daily.  chlorthalidone (HYGROTEN) 25 mg tablet     Sig: Take  by mouth daily.  predniSONE (DELTASONE) 5 mg tablet     Sig: Take  by mouth. Follow-up Disposition:  Return in about 6 months (around 8/6/2019). I have discussed the diagnosis with  Andressa Berman and the intended plan as seen in the above orders. Questions were answered concerning future plans. I have discussed medication side effects and warnings with the patient as well.     Thank you, Kyara Bateman MD for involving me in the care of  [facility]. Please do not hesitate to contact me for further questions/concerns. Written by Kya Carballo, dictated by Sammy De La Torre MD.    Prieto Judd MD, [address]      [facility], [address] [place] [address] [address]      [phone] / [phone] Fax

## 2019-02-06 ENCOUNTER — OFFICE VISIT (OUTPATIENT)
Dept: CARDIOLOGY CLINIC | Age: 84
End: 2019-02-06

## 2019-02-06 VITALS
DIASTOLIC BLOOD PRESSURE: 58 MMHG | BODY MASS INDEX: 23.99 KG/M2 | HEIGHT: 65 IN | HEART RATE: 76 BPM | WEIGHT: 144 LBS | SYSTOLIC BLOOD PRESSURE: 130 MMHG

## 2019-02-06 DIAGNOSIS — I10 ESSENTIAL HYPERTENSION: Primary | ICD-10-CM

## 2019-02-06 DIAGNOSIS — R06.02 SOB (SHORTNESS OF BREATH): ICD-10-CM

## 2019-02-06 RX ORDER — CHLORTHALIDONE 25 MG/1
TABLET ORAL DAILY
COMMUNITY
End: 2020-02-28 | Stop reason: SDUPTHER

## 2019-02-06 RX ORDER — PREDNISONE 5 MG/1
TABLET ORAL
COMMUNITY

## 2019-02-06 RX ORDER — ASPIRIN 81 MG/1
TABLET ORAL DAILY
COMMUNITY

## 2019-02-06 NOTE — PROGRESS NOTES
Visit Vitals  /58   Pulse 76   Ht 5' 5\" (1.651 m)   Wt 144 lb (65.3 kg)   BMI 23.96 kg/m²     Medication changes for this OV VO Dr Kali Gonzalez

## 2019-02-07 ENCOUNTER — HOSPITAL ENCOUNTER (OUTPATIENT)
Dept: LAB | Age: 84
Discharge: HOME | End: 2019-02-07
Payer: MEDICARE

## 2019-02-07 PROCEDURE — 80061 LIPID PANEL: CPT

## 2019-02-07 PROCEDURE — 36415 COLL VENOUS BLD VENIPUNCTURE: CPT

## 2019-02-07 PROCEDURE — 80076 HEPATIC FUNCTION PANEL: CPT

## 2019-02-08 LAB
ALBUMIN SERPL-MCNC: 4 G/DL (ref 3.5–4.7)
ALP SERPL-CCNC: 67 IU/L (ref 39–117)
ALT SERPL-CCNC: 16 IU/L (ref 0–44)
AST SERPL-CCNC: 19 IU/L (ref 0–40)
BILIRUB DIRECT SERPL-MCNC: 0.13 MG/DL (ref 0–0.4)
BILIRUB SERPL-MCNC: 0.5 MG/DL (ref 0–1.2)
CHOLEST SERPL-MCNC: 125 MG/DL (ref 100–199)
HDLC SERPL-MCNC: 32 MG/DL
INTERPRETATION, 910389: NORMAL
LDLC SERPL CALC-MCNC: 69 MG/DL (ref 0–99)
PROT SERPL-MCNC: 7.2 G/DL (ref 6–8.5)
TRIGL SERPL-MCNC: 119 MG/DL (ref 0–149)
VLDLC SERPL CALC-MCNC: 24 MG/DL (ref 5–40)

## 2019-07-16 RX ORDER — AMLODIPINE BESYLATE 10 MG/1
TABLET ORAL
Qty: 90 TAB | Refills: 0 | Status: SHIPPED | OUTPATIENT
Start: 2019-07-16 | End: 2019-10-16 | Stop reason: SDUPTHER

## 2019-07-16 NOTE — TELEPHONE ENCOUNTER
Refill is per verbal order of Dr. Jocelynn Gibbons.    Requested Prescriptions     Pending Prescriptions Disp Refills    amLODIPine (NORVASC) 10 mg tablet [Pharmacy Med Name: AMLODIPINE BESYLATE 10 MG Tablet] 90 Tab 0     Sig: TAKE 1 TABLET EVERY DAY

## 2019-08-07 ENCOUNTER — OFFICE VISIT (OUTPATIENT)
Dept: CARDIOLOGY CLINIC | Age: 84
End: 2019-08-07

## 2019-08-07 VITALS
BODY MASS INDEX: 23.59 KG/M2 | WEIGHT: 141.6 LBS | RESPIRATION RATE: 16 BRPM | HEART RATE: 74 BPM | OXYGEN SATURATION: 97 % | DIASTOLIC BLOOD PRESSURE: 62 MMHG | SYSTOLIC BLOOD PRESSURE: 122 MMHG | HEIGHT: 65 IN

## 2019-08-07 DIAGNOSIS — I10 ESSENTIAL HYPERTENSION: Primary | ICD-10-CM

## 2019-08-07 DIAGNOSIS — E78.5 DYSLIPIDEMIA: ICD-10-CM

## 2019-08-07 DIAGNOSIS — I10 ESSENTIAL HYPERTENSION: ICD-10-CM

## 2019-08-07 DIAGNOSIS — R06.02 SOB (SHORTNESS OF BREATH): ICD-10-CM

## 2019-08-07 NOTE — PROGRESS NOTES
Chanel Sinha is a 80 y.o. male    Chief Complaint   Patient presents with    Follow-up     6 mo f/u     Cholesterol Problem    Hypertension         Visit Vitals  /62 (BP 1 Location: Left arm, BP Patient Position: Sitting)   Pulse 74   Resp 16   Ht 5' 5\" (1.651 m)   Wt 141 lb 9.6 oz (64.2 kg)   SpO2 97%   BMI 23.56 kg/m²       1. Have you been to the ER, urgent care clinic since your last visit? Hospitalized since your last visit? NO    2. Have you seen or consulted any other health care providers outside of the [address] [place] [address] since your last visit? Include any pap smears or colon screening.   NO

## 2019-08-07 NOTE — LETTER
8/7/19 Patient: [facility] YOB: 1935 Date of Visit: 8/7/2019 Surya Burkett, [address] [address] VIA Facsimile: [phone] Dear Surya Burkett MD, Thank you for referring Mr. Jorge Ridley to [address]  for evaluation. My notes for this consultation are attached. If you have questions, please do not hesitate to call me. I look forward to following your patient along with you.  
 
 
Sincerely, 
 
Riley Smith MD

## 2019-08-07 NOTE — PROGRESS NOTES
LAST OFFICE VISIT : 2/6/19      ICD-10-CM ICD-9-CM   1. Essential hypertension I10 401.9   2. Dyslipidemia E78.5 272.4   3. SOB (shortness of breath) R06.02 786.05     Ousmane Sosa is a 80 y.o. male with HTN last seen by me 6 months ago.     Cardiac risk factors: sedentary life style, male gender, hypertension  I have personally obtained the history from the patient. HISTORY OF PRESENTING ILLNESS      Caesar Prescott reports he is felling good. He is feeling tired at times. His prostate keeps him up at night. He reports indigestion. Patient denies any exertional chest pain, dyspnea, palpitations, syncope, orthopnea, edema or paroxysmal nocturnal dyspnea.      ACTIVE PROBLEM LIST     Patient Active Problem List    Diagnosis Date Noted    Hip arthritis 08/02/2017    HTN (hypertension) 11/23/2016    Dizziness 08/09/2016           PAST MEDICAL HISTORY     Past Medical History:   Diagnosis Date    Arthritis     rheumatoid    Autoimmune disease ([address]     sjogren's syndrome, rheumatoid arhtritis    BPH (benign prostatic hyperplasia)     Cancer ([address]     skin cancer on ear right    Elevated cholesterol     Essential hypertension     Fatigue     Hyperlipidemia     Hypertension     Ill-defined condition     avascular necrosis left hip    Kidney stone     RA (rheumatoid arthritis) ([address]            PAST SURGICAL HISTORY     Past Surgical History:   Procedure Laterality Date    HX HEENT      bliat cataract surgery    HX ORTHOPAEDIC      right rotator cuff repair          ALLERGIES     Allergies   Allergen Reactions    Other Food Not Reported This Time     Does not eat deer meat or other sausages    Beef Containing Products Anaphylaxis     Reaction after tick bite    Pork Derived (Porcine) Anaphylaxis     Reaction after tick bite    Morphine Rash    Morphine Other (comments)     unknown          FAMILY HISTORY     Family History   Problem Relation Age of Onset    Diabetes Mother     Arthritis-osteo Father     Diabetes Sister     No Known Problems Brother     negative for cardiac disease       SOCIAL HISTORY     Social History     Socioeconomic History    Marital status:      Spouse name: Not on file    Number of children: Not on file    Years of education: Not on file    Highest education level: Not on file   Tobacco Use    Smoking status: Former Smoker     Last attempt to quit: 1970     Years since quittin.0    Smokeless tobacco: Former User     Quit date: 1980    Tobacco comment: smoked cigars     Substance and Sexual Activity    Alcohol use: No     Alcohol/week: 0.0 standard drinks    Drug use: No   Social History Narrative    ** Merged History Encounter **              MEDICATIONS     Current Outpatient Medications   Medication Sig    amLODIPine (NORVASC) 10 mg tablet TAKE 1 TABLET EVERY DAY    aspirin delayed-release 81 mg tablet Take  by mouth daily.  chlorthalidone (HYGROTEN) 25 mg tablet Take  by mouth daily.  hydroxychloroquine (PLAQUENIL) 200 mg tablet Take 200 mg by mouth daily.  enalapril (VASOTEC) 20 mg tablet     tamsulosin (FLOMAX) 0.4 mg capsule Take 0.4 mg by mouth nightly.  hydroCHLOROthiazide (HYDRODIURIL) 25 mg tablet Take 12.5 mg by mouth daily.  CHOLECALCIFEROL, VITAMIN D3, (VITAMIN D3 PO) Take 1,000 Units by mouth daily.  atorvastatin (LIPITOR) 10 mg tablet Take  by mouth daily.  multivitamin (ONE A DAY) tablet Take 1 Tab by mouth daily.  predniSONE (DELTASONE) 5 mg tablet Take  by mouth.  ascorbic acid, vitamin C, (VITAMIN C) 1,000 mg tablet Take 1,000 mg by mouth daily.  GLUC JIM/CHONDRO JIM A/VIT C/MN (GLUCOSAMINE-CHONDROITIN MAX ST PO) Take 1,000 mg by mouth. 2 tab daily     No current facility-administered medications for this visit. I have reviewed the nurses notes, vitals, problem list, allergy list, medical history, family, social history and medications.        REVIEW OF SYMPTOMS General: Pt denies excessive weight gain or loss. Pt is able to conduct ADL's  HEENT: Denies blurred vision, headaches, hearing loss, epistaxis and difficulty swallowing. Respiratory: Denies cough, congestion, shortness of breath, GILMORE, wheezing or stridor. Cardiovascular: Denies precordial pain, palpitations, edema or PND  Gastrointestinal: Denies poor appetite, indigestion, abdominal pain or blood in stool  Genitourinary: Denies hematuria, dysuria, increased urinary frequency  Musculoskeletal: Denies joint pain or swelling from muscles or joints  Neurologic: Denies tremor, paresthesias, headache, or sensory motor disturbance  Psychiatric: Denies confusion, insomnia, depression  Integumentray: Denies rash, itching or ulcers. Hematologic: Denies easy bruising, bleeding     PHYSICAL EXAMINATION      Vitals:    08/07/19 1336   BP: 122/62   Pulse: 74   Resp: 16   SpO2: 97%   Weight: 141 lb 9.6 oz (64.2 kg)   Height: 5' 5\" (1.651 m)     General: Well developed, in no acute distress. HEENT: No jaundice, oral mucosa moist, no oral ulcers  Neck: Supple, no stiffness, no lymphadenopathy, supple  Heart:  regular no gallop or rub, no jugular venous distention  Respiratory: Clear bilaterally x 4, no wheezing or rales  Extremities:  Peripheral pressure is strong. No edema, normal cap refill, no cyanosis. Musculoskeletal: No clubbing, no deformities  Neuro: A&Ox3, speech clear, gait stable, cooperative, no focal neurologic deficits  Skin: Skin color is normal. No rashes or lesions. Non diaphoretic, moist.       DIAGNOSTIC DATA     1. Echo  11/3/16 - EF 55-60%    2. Lipids  10/19/16- , HDL 32, LDL 79,   2/6/18- , HDL 31, LDL 80,   5/7/18-, HDL 28, LDL 78, Tg 192  2/7/19- , HDL 32, LDL 69,     3. Lexiscan  11/3/16- No ischemia, EF 62%    4.  LE Venous Doppler  8/2/17- no DVT      EKG 8/7/19  SR, normal EKG     LABORATORY DATA            Lab Results   Component Value Date/Time    WBC 9.5 07/19/2017 02:49 PM    HGB 9.9 (L) 08/04/2017 01:33 AM    HCT 41.1 07/19/2017 02:49 PM    PLATELET 664 23/07/0524 02:49 PM    MCV 95.8 07/19/2017 02:49 PM      Lab Results   Component Value Date/Time    Sodium 137 08/03/2017 01:14 AM    Potassium 4.2 08/03/2017 01:14 AM    Chloride 104 08/03/2017 01:14 AM    CO2 29 08/03/2017 01:14 AM    Anion gap 4 (L) 08/03/2017 01:14 AM    Glucose 143 (H) 08/03/2017 01:14 AM    BUN 18 08/03/2017 01:14 AM    Creatinine 1.09 08/03/2017 01:14 AM    BUN/Creatinine ratio 17 08/03/2017 01:14 AM    GFR est AA >60 08/03/2017 01:14 AM    GFR est non-AA >60 08/03/2017 01:14 AM    Calcium 9.2 08/03/2017 01:14 AM    Bilirubin, total 0.5 02/07/2019 09:25 AM    AST (SGOT) 19 02/07/2019 09:25 AM    Alk. phosphatase 67 02/07/2019 09:25 AM    Protein, total 7.2 02/07/2019 09:25 AM    Albumin 4.0 02/07/2019 09:25 AM    Globulin 4.0 07/19/2017 02:49 PM    A-G Ratio 1.0 (L) 07/19/2017 02:49 PM    ALT (SGPT) 16 02/07/2019 09:25 AM       ASSESSMENT/RECOMMENDATIONS:.      1. HTN  - BP is under good control today on current medical regiment. - On recheck 108/52, slightly low  - Stop HCTZ for time being d/t dizziness and lightheadedness. His daughter will provide BP readings over the next several weeks. 2. Dyslipidemia  - Lipids are at goal.  - Followed by Alvaro Fang MD   - Given lab requisition to be check in 6 months     Return in 6 months or PRN. Orders Placed This Encounter    HEPATIC FUNCTION PANEL     Standing Status:   Future     Standing Expiration Date:   8/7/2020    LIPID PANEL    AMB POC EKG ROUTINE W/ 12 LEADS, INTER & REP     Order Specific Question:   Reason for Exam:     Answer:   SOB, HTN          Follow-up and Dispositions  ·   Return in about 6 months (around 2/7/2020). I have discussed the diagnosis with  Martínez Gomez and the intended plan as seen in the above orders. Questions were answered concerning future plans.   I have discussed medication side effects and warnings with the patient as well. Thank you, Tiffanie Crowe MD for involving me in the care of  [facility]. Please do not hesitate to contact me for further questions/concerns. Written by Anand Lugo, as dictated by Fran James M.D.     Buddy Chowdhury. MD [place], [address]      [facility], [address] [place] [address] [address]      [phone] / [phone] Fax

## 2019-10-23 RX ORDER — AMLODIPINE BESYLATE 10 MG/1
TABLET ORAL
Qty: 90 TAB | Refills: 2 | Status: SHIPPED | OUTPATIENT
Start: 2019-10-23 | End: 2020-05-25

## 2020-01-13 ENCOUNTER — HOSPITAL ENCOUNTER (OUTPATIENT)
Dept: LAB | Age: 85
Discharge: HOME | End: 2020-01-13
Payer: MEDICARE

## 2020-01-13 PROCEDURE — 80076 HEPATIC FUNCTION PANEL: CPT

## 2020-01-13 PROCEDURE — 36415 COLL VENOUS BLD VENIPUNCTURE: CPT

## 2020-01-13 PROCEDURE — 80061 LIPID PANEL: CPT

## 2020-01-14 LAB
ALBUMIN SERPL-MCNC: 4.2 G/DL (ref 3.5–4.7)
ALP SERPL-CCNC: 87 IU/L (ref 39–117)
ALT SERPL-CCNC: 13 IU/L (ref 0–44)
AST SERPL-CCNC: 20 IU/L (ref 0–40)
BILIRUB DIRECT SERPL-MCNC: 0.14 MG/DL (ref 0–0.4)
BILIRUB SERPL-MCNC: 0.5 MG/DL (ref 0–1.2)
CHOLEST SERPL-MCNC: 116 MG/DL (ref 100–199)
HDLC SERPL-MCNC: 33 MG/DL
INTERPRETATION, 910389: NORMAL
LDLC SERPL CALC-MCNC: 62 MG/DL (ref 0–99)
PROT SERPL-MCNC: 6.4 G/DL (ref 6–8.5)
TRIGL SERPL-MCNC: 105 MG/DL (ref 0–149)
VLDLC SERPL CALC-MCNC: 21 MG/DL (ref 5–40)

## 2020-01-23 DIAGNOSIS — E78.5 DYSLIPIDEMIA: Primary | ICD-10-CM

## 2020-02-17 DIAGNOSIS — E78.5 DYSLIPIDEMIA: Primary | ICD-10-CM

## 2020-02-19 ENCOUNTER — OFFICE VISIT (OUTPATIENT)
Dept: CARDIOLOGY CLINIC | Age: 85
End: 2020-02-19

## 2020-02-19 VITALS
WEIGHT: 145 LBS | DIASTOLIC BLOOD PRESSURE: 60 MMHG | HEIGHT: 65 IN | BODY MASS INDEX: 24.16 KG/M2 | HEART RATE: 72 BPM | SYSTOLIC BLOOD PRESSURE: 140 MMHG

## 2020-02-19 DIAGNOSIS — I10 ESSENTIAL HYPERTENSION: Primary | ICD-10-CM

## 2020-02-19 DIAGNOSIS — E78.5 DYSLIPIDEMIA: ICD-10-CM

## 2020-02-19 NOTE — PROGRESS NOTES
LAST OFFICE VISIT : 8/7/19      ICD-10-CM ICD-9-CM   1. Essential hypertension I10 401.9   2. Dyslipidemia E78.5 272.4        Yazan Suero is a 80 y.o. male with HTN last seen by me 6 months ago.     Cardiac risk factors: sedentary life style, male gender, hypertension  I have personally obtained the history from the patient. HISTORY OF PRESENTING ILLNESS      Caesar PLUNKETT Janell Carlson reports numbness in left leg. He reports sedentary lifestyle. He reports he no longer drives d/t diplopia. Patient denies any exertional chest pain, dyspnea, palpitations, syncope, orthopnea, edema or paroxysmal nocturnal dyspnea.      ACTIVE PROBLEM LIST     Patient Active Problem List    Diagnosis Date Noted    Hip arthritis 08/02/2017    HTN (hypertension) 11/23/2016    Dizziness 08/09/2016           PAST MEDICAL HISTORY     Past Medical History:   Diagnosis Date    Arthritis     rheumatoid    Autoimmune disease ([facility] [address]     sjogren's syndrome, rheumatoid arhtritis    BPH (benign prostatic hyperplasia)     Cancer ([facility] [address]     skin cancer on ear right    Elevated cholesterol     Essential hypertension     Fatigue     Hyperlipidemia     Hypertension     Ill-defined condition     avascular necrosis left hip    Kidney stone     RA (rheumatoid arthritis) ([address]            PAST SURGICAL HISTORY     Past Surgical History:   Procedure Laterality Date    HX HEENT      bliat cataract surgery    HX ORTHOPAEDIC      right rotator cuff repair          ALLERGIES     Allergies   Allergen Reactions    Other Food Not Reported This Time     Does not eat deer meat or other sausages    Beef Containing Products Anaphylaxis     Reaction after tick bite    Pork Derived (Porcine) Anaphylaxis     Reaction after tick bite    Morphine Rash    Morphine Other (comments)     unknown          FAMILY HISTORY     Family History   Problem Relation Age of Onset    Diabetes Mother     Arthritis-osteo Father     Diabetes Sister     No Known Problems Brother     negative for cardiac disease       SOCIAL HISTORY     Social History     Socioeconomic History    Marital status:      Spouse name: Not on file    Number of children: Not on file    Years of education: Not on file    Highest education level: Not on file   Tobacco Use    Smoking status: Former Smoker     Last attempt to quit: 1970     Years since quittin.5    Smokeless tobacco: Former User     Quit date: 1980    Tobacco comment: smoked cigars     Substance and Sexual Activity    Alcohol use: No     Alcohol/week: 0.0 standard drinks    Drug use: No   Social History Narrative    ** Merged History Encounter **              MEDICATIONS     Current Outpatient Medications   Medication Sig    amLODIPine (NORVASC) 10 mg tablet TAKE 1 TABLET EVERY DAY    aspirin delayed-release 81 mg tablet Take  by mouth daily.  chlorthalidone (HYGROTEN) 25 mg tablet Take  by mouth daily.  predniSONE (DELTASONE) 5 mg tablet Take  by mouth.  hydroxychloroquine (PLAQUENIL) 200 mg tablet Take 200 mg by mouth daily.  enalapril (VASOTEC) 20 mg tablet     tamsulosin (FLOMAX) 0.4 mg capsule Take 0.4 mg by mouth nightly.  hydroCHLOROthiazide (HYDRODIURIL) 25 mg tablet Take 12.5 mg by mouth daily.  CHOLECALCIFEROL, VITAMIN D3, (VITAMIN D3 PO) Take 1,000 Units by mouth daily.  atorvastatin (LIPITOR) 10 mg tablet Take  by mouth daily.  multivitamin (ONE A DAY) tablet Take 1 Tab by mouth daily. No current facility-administered medications for this visit. I have reviewed the nurses notes, vitals, problem list, allergy list, medical history, family, social history and medications. REVIEW OF SYMPTOMS      General: Pt denies excessive weight gain or loss. Pt is able to conduct ADL's  HEENT: Denies blurred vision, headaches, hearing loss, epistaxis and difficulty swallowing.  +diplopia  Respiratory: Denies cough, congestion, shortness of breath, GILMORE, wheezing or stridor. Cardiovascular: Denies precordial pain, palpitations, edema or PND  Gastrointestinal: Denies poor appetite, indigestion, abdominal pain or blood in stool  Genitourinary: Denies hematuria, dysuria, increased urinary frequency  Musculoskeletal: Denies joint pain or swelling from muscles or joints  Neurologic: Denies tremor, paresthesias, headache, or sensory motor disturbance  Psychiatric: Denies confusion, insomnia, depression  Integumentray: Denies rash, itching or ulcers. Hematologic: Denies easy bruising, bleeding     PHYSICAL EXAMINATION      Vitals:    02/19/20 1359   BP: 140/60   Pulse: 72   Weight: 145 lb (65.8 kg)   Height: 5' 5\" (1.651 m)     General: Well developed, in no acute distress. HEENT: No jaundice, oral mucosa moist, no oral ulcers  Neck: Supple, no stiffness, no lymphadenopathy, supple  Heart:  regular no gallop or rub, no jugular venous distention  Respiratory: Clear bilaterally x 4, no wheezing or rales  Extremities:  Peripheral pressure is strong. No edema, normal cap refill, no cyanosis. Musculoskeletal: No clubbing, no deformities  Neuro: A&Ox3, speech clear, gait stable, cooperative, no focal neurologic deficits  Skin: Skin color is normal. No rashes or lesions. Non diaphoretic, moist.       DIAGNOSTIC DATA     1. Echo  11/3/16 - EF 55-60%    2. Lipids  10/19/16- , HDL 32, LDL 79,   2/6/18- , HDL 31, LDL 80,   5/7/18-, HDL 28, LDL 78, Tg 192  2/7/19- , HDL 32, LDL 69,   1/13/20- , HDL 33, LDL 62,      3. Lexiscan  11/3/16- No ischemia, EF 62%    4.  LE Venous Doppler  8/2/17- no DVT      EKG 8/7/19  SR, normal EKG     LABORATORY DATA            Lab Results   Component Value Date/Time    WBC 9.5 07/19/2017 02:49 PM    HGB 9.9 (L) 08/04/2017 01:33 AM    HCT 41.1 07/19/2017 02:49 PM    PLATELET 603 08/49/6612 02:49 PM    MCV 95.8 07/19/2017 02:49 PM      Lab Results   Component Value Date/Time    Sodium 137 08/03/2017 01:14 AM Potassium 4.2 08/03/2017 01:14 AM    Chloride 104 08/03/2017 01:14 AM    CO2 29 08/03/2017 01:14 AM    Anion gap 4 (L) 08/03/2017 01:14 AM    Glucose 143 (H) 08/03/2017 01:14 AM    BUN 18 08/03/2017 01:14 AM    Creatinine 1.09 08/03/2017 01:14 AM    BUN/Creatinine ratio 17 08/03/2017 01:14 AM    GFR est AA >60 08/03/2017 01:14 AM    GFR est non-AA >60 08/03/2017 01:14 AM    Calcium 9.2 08/03/2017 01:14 AM    Bilirubin, total 0.5 01/13/2020 10:01 AM    AST (SGOT) 20 01/13/2020 10:01 AM    Alk. phosphatase 87 01/13/2020 10:01 AM    Protein, total 6.4 01/13/2020 10:01 AM    Albumin 4.2 01/13/2020 10:01 AM    Globulin 4.0 07/19/2017 02:49 PM    A-G Ratio 1.0 (L) 07/19/2017 02:49 PM    ALT (SGPT) 13 01/13/2020 10:01 AM       ASSESSMENT/RECOMMENDATIONS:.      1. HTN  - Diastolic is slightly elevated  - In the past, on HCTZ it was elevated. - He is listed as taking HCTZ and I have instructed him to stop HCTZ for time being d/t also being chlorothalidone.  - BP is reasonable for his age. 2. Dyslipidemia  - Lipids are at goal. Last LDL was 62.  - Followed by Sammy Gerard MD   - Given lab requisition to be check in 6 months     Return in 6 months or PRN. No orders of the defined types were placed in this encounter. Follow-up and Dispositions  ·   Return in about 6 months (around 8/19/2020). I have discussed the diagnosis with  Nancy Jacob and the intended plan as seen in the above orders. Questions were answered concerning future plans. I have discussed medication side effects and warnings with the patient as well. Thank you, Sammy Gerard MD for involving me in the care of  Nancy Jacob. Please do not hesitate to contact me for further questions/concerns. Written by [facility], as dictated by Paco Newman M.D.     BernadineBurak Curtis MD, [address] [place] [address] Rd[address]      [address] [facility], [address], [address]      [phone] / [phone] Fax

## 2020-02-19 NOTE — PROGRESS NOTES
Visit Vitals  /60   Pulse 72   Ht 5' 5\" (1.651 m)   Wt 145 lb (65.8 kg)   BMI 24.13 kg/m²     Medication changes for this OV VO Dr Joaquin Massey

## 2020-02-19 NOTE — PATIENT INSTRUCTIONS
- If you are taking chlorothalidone and Hydrochlorothiazide, I would prefer you stop hydrochlorothiazide and have Blood Pressure checked at Juanis Mehta MD  - Do not take aspirin at this time  - Follow up in 6 months

## 2020-02-19 NOTE — LETTER
2/19/20 Patient: Kemi Tam YOB: 1935 Date of Visit: 2/19/2020 Traci Reynolds, [address] [address] VIA Facsimile: [phone] Dear Traci Reynolds MD, Thank you for referring Mr. Eb Black to [address] for evaluation. My notes for this consultation are attached. If you have questions, please do not hesitate to call me. I look forward to following your patient along with you.  
 
 
Sincerely, 
 
Mey Lund MD

## 2020-02-24 NOTE — TELEPHONE ENCOUNTER
Per VO of Dr. Louise Marcum: 2/19/2020    Future Appointments   Date Time Provider Grisel Judith   9/2/2020  2:00 PM Marco Judd MD [facility] SCHED       Requested Prescriptions     Pending Prescriptions Disp Refills    hydroCHLOROthiazide (HYDRODIURIL) 25 mg tablet 90 Tab 1     Sig: Take 0.5 Tabs by mouth daily.

## 2020-02-28 RX ORDER — CHLORTHALIDONE 25 MG/1
25 TABLET ORAL DAILY
Qty: 90 TAB | Refills: 3 | Status: SHIPPED | OUTPATIENT
Start: 2020-02-28

## 2020-02-28 RX ORDER — HYDROCHLOROTHIAZIDE 25 MG/1
12.5 TABLET ORAL DAILY
Qty: 90 TAB | Refills: 1 | OUTPATIENT
Start: 2020-02-28

## 2020-05-25 RX ORDER — AMLODIPINE BESYLATE 10 MG/1
TABLET ORAL
Qty: 90 TAB | Refills: 2 | Status: SHIPPED | OUTPATIENT
Start: 2020-05-25

## 2020-06-01 RX ORDER — ENALAPRIL MALEATE 20 MG/1
20 TABLET ORAL DAILY
Qty: 90 TAB | Refills: 2 | Status: SHIPPED | OUTPATIENT
Start: 2020-06-01

## 2020-06-01 NOTE — TELEPHONE ENCOUNTER
Patients spouse states that they are having a hard time getting in with the provider that prescribed Enalapril and would like to know if Dr Deneen Chambers will take over the prescription. Please advise.      Phone: [phone]

## 2021-07-09 RX ORDER — CHLORTHALIDONE 25 MG/1
TABLET ORAL
Qty: 90 TABLET | Refills: 3 | OUTPATIENT
Start: 2021-07-09

## 2021-07-09 RX ORDER — ENALAPRIL MALEATE 20 MG/1
TABLET ORAL
Qty: 90 TABLET | Refills: 2 | OUTPATIENT
Start: 2021-07-09

## (undated) DEVICE — SKIN MARKER,REGULAR TIP WITH RULER AND LABELS: Brand: DEVON

## (undated) DEVICE — TIBURON SPLIT SHEET: Brand: CONVERTORS

## (undated) DEVICE — Z CONVERTED USE 2271043 CONTAINER SPEC COLL 4OZ SCR ON LID PEEL PCH

## (undated) DEVICE — STERILE POLYISOPRENE POWDER-FREE SURGICAL GLOVES: Brand: PROTEXIS

## (undated) DEVICE — SWAB CULT DBL W/O CHAR RAYON TIP AMIES GEL CLMN FOR COLL

## (undated) DEVICE — SOLUTION IRRIG 3000ML 0.9% SOD CHL FLX CONT 0797208] ICU MEDICAL INC]

## (undated) DEVICE — HANDPIECE SET WITH COAXIAL HIGH FLOW TIP AND SUCTION TUBE: Brand: INTERPULSE

## (undated) DEVICE — WATERPROOF, BACTERIA PROOF DRESSING WITH ABSORBENT SEE THROUGH PAD: Brand: OPSITE POST-OP VISIBLE 25X10CM CTN 20

## (undated) DEVICE — SUTURE MCRYL SZ 3-0 L27IN ABSRB UD L24MM PS-1 3/8 CIR PRIM Y936H

## (undated) DEVICE — REM POLYHESIVE ADULT PATIENT RETURN ELECTRODE: Brand: VALLEYLAB

## (undated) DEVICE — STRYKER PERFORMANCE SERIES SAGITTAL BLADE: Brand: STRYKER PERFORMANCE SERIES

## (undated) DEVICE — DEVON™ KNEE AND BODY STRAP 60" X 3" (1.5 M X 7.6 CM): Brand: DEVON

## (undated) DEVICE — DERMABOND SKIN ADH 0.7ML -- DERMABOND ADVANCED 12/BX

## (undated) DEVICE — GOWN,SIRUS,NONRNF,SETINSLV,2XL,18/CS: Brand: MEDLINE

## (undated) DEVICE — DRAPE XR C ARM 41X74IN LF --

## (undated) DEVICE — T4 HOOD

## (undated) DEVICE — (D)PREP SKN CHLRAPRP APPL 26ML -- CONVERT TO ITEM 371833

## (undated) DEVICE — YANKAUER OPEN TIP, NO VENT: Brand: ARGYLE

## (undated) DEVICE — Device

## (undated) DEVICE — SUTURE VCRL + SZ 1-0 L36IN ABSRB UD CTX 1/2 CIR TAPR PNT VCP977H

## (undated) DEVICE — 6619 2 PTNT ISO SYS INCISE AREA&LT;(&GT;&&LT;)&GT;P: Brand: STERI-DRAPE™ IOBAN™ 2

## (undated) DEVICE — 1010 S-DRAPE TOWEL DRAPE 10/BX: Brand: STERI-DRAPE™

## (undated) DEVICE — INFECTION CONTROL KIT SYS

## (undated) DEVICE — STERILE POLYISOPRENE POWDER-FREE SURGICAL GLOVES WITH EMOLLIENT COATING: Brand: PROTEXIS

## (undated) DEVICE — GAUZE SPONGES,12 PLY: Brand: CURITY

## (undated) DEVICE — SUTURE VCRL SZ 2-0 L36IN ABSRB UD L36MM CT-1 1/2 CIR J945H

## (undated) DEVICE — LIGHT HANDLE: Brand: DEVON

## (undated) DEVICE — INTENDED FOR TISSUE SEPARATION, AND OTHER PROCEDURES THAT REQUIRE A SHARP SURGICAL BLADE TO PUNCTURE OR CUT.: Brand: BARD-PARKER ® CARBON RIB-BACK BLADES